# Patient Record
Sex: MALE | Race: WHITE | Employment: OTHER | URBAN - METROPOLITAN AREA
[De-identification: names, ages, dates, MRNs, and addresses within clinical notes are randomized per-mention and may not be internally consistent; named-entity substitution may affect disease eponyms.]

---

## 2024-10-23 ENCOUNTER — APPOINTMENT (OUTPATIENT)
Dept: CT IMAGING | Age: 59
DRG: 115 | End: 2024-10-23
Payer: MEDICAID

## 2024-10-23 ENCOUNTER — APPOINTMENT (OUTPATIENT)
Dept: GENERAL RADIOLOGY | Age: 59
DRG: 115 | End: 2024-10-23
Payer: MEDICAID

## 2024-10-23 ENCOUNTER — HOSPITAL ENCOUNTER (INPATIENT)
Age: 59
LOS: 1 days | Discharge: HOME | DRG: 115 | End: 2024-10-29
Attending: EMERGENCY MEDICINE | Admitting: STUDENT IN AN ORGANIZED HEALTH CARE EDUCATION/TRAINING PROGRAM
Payer: MEDICAID

## 2024-10-23 DIAGNOSIS — R55 SYNCOPE AND COLLAPSE: Primary | ICD-10-CM

## 2024-10-23 DIAGNOSIS — I48.20 CHRONIC A-FIB (HCC): ICD-10-CM

## 2024-10-23 DIAGNOSIS — S02.2XXA CLOSED FRACTURE OF NASAL BONE, INITIAL ENCOUNTER: ICD-10-CM

## 2024-10-23 DIAGNOSIS — R55 SYNCOPE, UNSPECIFIED SYNCOPE TYPE: ICD-10-CM

## 2024-10-23 DIAGNOSIS — R42 DIZZINESS: ICD-10-CM

## 2024-10-23 DIAGNOSIS — S52.392A: ICD-10-CM
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
## 2024-10-23 PROBLEM — I82.409 DVT (DEEP VENOUS THROMBOSIS) (HCC): Status: ACTIVE | Noted: 2021-10-18

## 2024-10-23 PROBLEM — M19.90 ARTHRITIS: Status: ACTIVE | Noted: 2021-10-18

## 2024-10-23 PROBLEM — N18.9 CHRONIC KIDNEY DISEASE: Status: ACTIVE | Noted: 2021-10-18

## 2024-10-23 PROBLEM — Z98.890 H/O FASCIOTOMY: Status: ACTIVE | Noted: 2024-01-30

## 2024-10-23 PROBLEM — N39.0 UTI (URINARY TRACT INFECTION): Status: ACTIVE | Noted: 2024-10-23

## 2024-10-23 PROBLEM — Z79.01 ANTICOAGULATED: Status: ACTIVE | Noted: 2024-10-23

## 2024-10-23 PROBLEM — I10 HYPERTENSION: Status: ACTIVE | Noted: 2021-10-18

## 2024-10-23 PROBLEM — S09.90XA HEAD TRAUMA, INITIAL ENCOUNTER: Status: ACTIVE | Noted: 2024-10-23

## 2024-10-23 PROBLEM — I73.9 PAD (PERIPHERAL ARTERY DISEASE) (HCC): Status: ACTIVE | Noted: 2024-01-30

## 2024-10-23 LAB
ABO + RH BLD: NORMAL
ALBUMIN SERPL-MCNC: 3.7 G/DL (ref 3.5–5.2)
ALBUMIN/GLOB SERPL: 1 {RATIO} (ref 1–2.5)
ALP SERPL-CCNC: 69 U/L (ref 40–129)
ALT SERPL-CCNC: 8 U/L (ref 10–50)
ANION GAP SERPL CALCULATED.3IONS-SCNC: 14 MMOL/L (ref 9–16)
ANION GAP SERPL CALCULATED.3IONS-SCNC: 20 MMOL/L (ref 9–16)
ANION GAP SERPL CALCULATED.3IONS-SCNC: ABNORMAL MMOL/L
ARM BAND NUMBER: NORMAL
AST SERPL-CCNC: 29 U/L (ref 10–50)
BACTERIA URNS QL MICRO: ABNORMAL
BASOPHILS # BLD: 0.03 K/UL (ref 0–0.2)
BASOPHILS NFR BLD: 1 % (ref 0–2)
BILIRUB SERPL-MCNC: 0.4 MG/DL (ref 0–1.2)
BILIRUB UR QL STRIP: NEGATIVE
BLOOD BANK SAMPLE EXPIRATION: NORMAL
BLOOD BANK SPECIMEN: ABNORMAL
BLOOD GROUP ANTIBODIES SERPL: NEGATIVE
BNP SERPL-MCNC: 1142 PG/ML (ref 0–300)
BODY TEMPERATURE: 37
BUN SERPL-MCNC: 12 MG/DL (ref 6–20)
BUN SERPL-MCNC: 12 MG/DL (ref 6–20)
BUN SERPL-MCNC: ABNORMAL MG/DL
CALCIUM SERPL-MCNC: 8.9 MG/DL (ref 8.6–10.4)
CASTS #/AREA URNS LPF: ABNORMAL /LPF (ref 0–8)
CHLORIDE SERPL-SCNC: 96 MMOL/L (ref 98–107)
CHLORIDE SERPL-SCNC: 98 MMOL/L (ref 98–107)
CHLORIDE SERPL-SCNC: ABNORMAL MMOL/L
CLARITY UR: ABNORMAL
CO2 SERPL-SCNC: 17 MMOL/L (ref 20–31)
CO2 SERPL-SCNC: 21 MMOL/L (ref 20–31)
CO2 SERPL-SCNC: ABNORMAL MMOL/L
COHGB MFR BLD: 2.3 % (ref 0–5)
COLOR UR: YELLOW
CREAT SERPL-MCNC: 1.3 MG/DL (ref 0.7–1.2)
CREAT SERPL-MCNC: 1.6 MG/DL (ref 0.7–1.2)
CREAT SERPL-MCNC: ABNORMAL MG/DL
EOSINOPHIL # BLD: 0.22 K/UL (ref 0–0.44)
EOSINOPHILS RELATIVE PERCENT: 4 % (ref 1–4)
EPI CELLS #/AREA URNS HPF: ABNORMAL /HPF (ref 0–5)
ERYTHROCYTE [DISTWIDTH] IN BLOOD BY AUTOMATED COUNT: 13.5 % (ref 11.8–14.4)
ERYTHROCYTE [DISTWIDTH] IN BLOOD BY AUTOMATED COUNT: 13.6 % (ref 11.8–14.4)
ETHANOL PERCENT: 0.06 %
ETHANOL PERCENT: ABNORMAL %
ETHANOLAMINE SERPL-MCNC: 57 MG/DL (ref 0–0.08)
ETHANOLAMINE SERPL-MCNC: ABNORMAL MG/DL (ref 0–0.08)
FIO2 ON VENT: ABNORMAL %
GFR, ESTIMATED: 52 ML/MIN/1.73M2
GFR, ESTIMATED: 63 ML/MIN/1.73M2
GFR, ESTIMATED: ABNORMAL ML/MIN/1.73M2
GLUCOSE SERPL-MCNC: 84 MG/DL (ref 74–99)
GLUCOSE SERPL-MCNC: 89 MG/DL (ref 74–99)
GLUCOSE SERPL-MCNC: ABNORMAL MG/DL
GLUCOSE UR STRIP-MCNC: NEGATIVE MG/DL
HCG SERPL QL: ABNORMAL
HCO3 VENOUS: 21.9 MMOL/L (ref 24–30)
HCT VFR BLD AUTO: 39.6 % (ref 40.7–50.3)
HCT VFR BLD AUTO: 42.4 % (ref 40.7–50.3)
HGB BLD-MCNC: 12.5 G/DL (ref 13–17)
HGB BLD-MCNC: 13.7 G/DL (ref 13–17)
HGB UR QL STRIP.AUTO: NEGATIVE
IMM GRANULOCYTES # BLD AUTO: <0.03 K/UL (ref 0–0.3)
IMM GRANULOCYTES NFR BLD: 0 %
INR PPP: 1.2
KETONES UR STRIP-MCNC: NEGATIVE MG/DL
LEUKOCYTE ESTERASE UR QL STRIP: ABNORMAL
LYMPHOCYTES NFR BLD: 3.13 K/UL (ref 1.1–3.7)
LYMPHOCYTES RELATIVE PERCENT: 49 % (ref 24–43)
MCH RBC QN AUTO: 30.5 PG (ref 25.2–33.5)
MCH RBC QN AUTO: 30.9 PG (ref 25.2–33.5)
MCHC RBC AUTO-ENTMCNC: 31.6 G/DL (ref 28.4–34.8)
MCHC RBC AUTO-ENTMCNC: 32.3 G/DL (ref 28.4–34.8)
MCV RBC AUTO: 95.5 FL (ref 82.6–102.9)
MCV RBC AUTO: 96.6 FL (ref 82.6–102.9)
MONOCYTES NFR BLD: 0.54 K/UL (ref 0.1–1.2)
MONOCYTES NFR BLD: 9 % (ref 3–12)
NEGATIVE BASE EXCESS, VEN: 3.9 MMOL/L (ref 0–2)
NEUTROPHILS NFR BLD: 37 % (ref 36–65)
NEUTS SEG NFR BLD: 2.34 K/UL (ref 1.5–8.1)
NITRITE UR QL STRIP: POSITIVE
NRBC BLD-RTO: 0 PER 100 WBC
NRBC BLD-RTO: 0 PER 100 WBC
O2 SAT, VEN: 75 % (ref 60–85)
PARTIAL THROMBOPLASTIN TIME: 26.7 SEC (ref 23–36.5)
PCO2 VENOUS: 45.1 MM HG (ref 39–55)
PH UR STRIP: 6 [PH] (ref 5–8)
PH VENOUS: 7.31 (ref 7.32–7.42)
PLATELET # BLD AUTO: 226 K/UL (ref 138–453)
PLATELET # BLD AUTO: 231 K/UL (ref 138–453)
PMV BLD AUTO: 10.3 FL (ref 8.1–13.5)
PMV BLD AUTO: 10.5 FL (ref 8.1–13.5)
PO2 VENOUS: 45 MM HG (ref 30–50)
POTASSIUM SERPL-SCNC: 3.8 MMOL/L (ref 3.7–5.3)
POTASSIUM SERPL-SCNC: 3.9 MMOL/L (ref 3.7–5.3)
POTASSIUM SERPL-SCNC: ABNORMAL MMOL/L
PROT SERPL-MCNC: 7 G/DL (ref 6.6–8.7)
PROT UR STRIP-MCNC: ABNORMAL MG/DL
PROTHROMBIN TIME: 14.6 SEC (ref 11.7–14.9)
RBC # BLD AUTO: 4.1 M/UL (ref 4.21–5.77)
RBC # BLD AUTO: 4.44 M/UL (ref 4.21–5.77)
RBC #/AREA URNS HPF: ABNORMAL /HPF (ref 0–4)
SODIUM SERPL-SCNC: 133 MMOL/L (ref 136–145)
SODIUM SERPL-SCNC: 133 MMOL/L (ref 136–145)
SODIUM SERPL-SCNC: ABNORMAL MMOL/L
SP GR UR STRIP: 1.01 (ref 1–1.03)
TROPONIN I SERPL HS-MCNC: 15 NG/L (ref 0–22)
TROPONIN I SERPL HS-MCNC: 16 NG/L (ref 0–22)
UROBILINOGEN UR STRIP-ACNC: NORMAL EU/DL (ref 0–1)
WBC #/AREA URNS HPF: ABNORMAL /HPF (ref 0–5)
WBC OTHER # BLD: 6.3 K/UL (ref 3.5–11.3)
WBC OTHER # BLD: 8.3 K/UL (ref 3.5–11.3)

## 2024-10-23 PROCEDURE — 85730 THROMBOPLASTIN TIME PARTIAL: CPT

## 2024-10-23 PROCEDURE — 86901 BLOOD TYPING SEROLOGIC RH(D): CPT

## 2024-10-23 PROCEDURE — 81001 URINALYSIS AUTO W/SCOPE: CPT

## 2024-10-23 PROCEDURE — 73110 X-RAY EXAM OF WRIST: CPT

## 2024-10-23 PROCEDURE — 2500000003 HC RX 250 WO HCPCS

## 2024-10-23 PROCEDURE — 93005 ELECTROCARDIOGRAM TRACING: CPT | Performed by: NURSE PRACTITIONER

## 2024-10-23 PROCEDURE — 74174 CTA ABD&PLVS W/CONTRAST: CPT

## 2024-10-23 PROCEDURE — 96375 TX/PRO/DX INJ NEW DRUG ADDON: CPT

## 2024-10-23 PROCEDURE — 85027 COMPLETE CBC AUTOMATED: CPT

## 2024-10-23 PROCEDURE — 99222 1ST HOSP IP/OBS MODERATE 55: CPT | Performed by: SURGERY

## 2024-10-23 PROCEDURE — 99223 1ST HOSP IP/OBS HIGH 75: CPT | Performed by: NURSE PRACTITIONER

## 2024-10-23 PROCEDURE — 99285 EMERGENCY DEPT VISIT HI MDM: CPT

## 2024-10-23 PROCEDURE — 85025 COMPLETE CBC W/AUTO DIFF WBC: CPT

## 2024-10-23 PROCEDURE — 73562 X-RAY EXAM OF KNEE 3: CPT

## 2024-10-23 PROCEDURE — 82565 ASSAY OF CREATININE: CPT

## 2024-10-23 PROCEDURE — 80051 ELECTROLYTE PANEL: CPT

## 2024-10-23 PROCEDURE — 96361 HYDRATE IV INFUSION ADD-ON: CPT

## 2024-10-23 PROCEDURE — 6360000004 HC RX CONTRAST MEDICATION

## 2024-10-23 PROCEDURE — 6370000000 HC RX 637 (ALT 250 FOR IP): Performed by: NURSE PRACTITIONER

## 2024-10-23 PROCEDURE — 84484 ASSAY OF TROPONIN QUANT: CPT

## 2024-10-23 PROCEDURE — 86900 BLOOD TYPING SEROLOGIC ABO: CPT

## 2024-10-23 PROCEDURE — 6360000002 HC RX W HCPCS

## 2024-10-23 PROCEDURE — 96374 THER/PROPH/DIAG INJ IV PUSH: CPT

## 2024-10-23 PROCEDURE — 84520 ASSAY OF UREA NITROGEN: CPT

## 2024-10-23 PROCEDURE — 6360000002 HC RX W HCPCS: Performed by: NURSE PRACTITIONER

## 2024-10-23 PROCEDURE — 71275 CT ANGIOGRAPHY CHEST: CPT

## 2024-10-23 PROCEDURE — 93005 ELECTROCARDIOGRAM TRACING: CPT

## 2024-10-23 PROCEDURE — 84703 CHORIONIC GONADOTROPIN ASSAY: CPT

## 2024-10-23 PROCEDURE — 80053 COMPREHEN METABOLIC PANEL: CPT

## 2024-10-23 PROCEDURE — 2580000003 HC RX 258: Performed by: NURSE PRACTITIONER

## 2024-10-23 PROCEDURE — 85610 PROTHROMBIN TIME: CPT

## 2024-10-23 PROCEDURE — 82805 BLOOD GASES W/O2 SATURATION: CPT

## 2024-10-23 PROCEDURE — 82947 ASSAY GLUCOSE BLOOD QUANT: CPT

## 2024-10-23 PROCEDURE — 86850 RBC ANTIBODY SCREEN: CPT

## 2024-10-23 PROCEDURE — 72125 CT NECK SPINE W/O DYE: CPT

## 2024-10-23 PROCEDURE — G0378 HOSPITAL OBSERVATION PER HR: HCPCS

## 2024-10-23 PROCEDURE — 70450 CT HEAD/BRAIN W/O DYE: CPT

## 2024-10-23 PROCEDURE — G0480 DRUG TEST DEF 1-7 CLASSES: HCPCS

## 2024-10-23 PROCEDURE — 83880 ASSAY OF NATRIURETIC PEPTIDE: CPT

## 2024-10-23 RX ORDER — ONDANSETRON 4 MG/1
4 TABLET, ORALLY DISINTEGRATING ORAL EVERY 8 HOURS PRN
Status: DISCONTINUED | OUTPATIENT
Start: 2024-10-23 | End: 2024-10-29 | Stop reason: HOSPADM

## 2024-10-23 RX ORDER — LORAZEPAM 2 MG/ML
4 INJECTION INTRAMUSCULAR
Status: DISCONTINUED | OUTPATIENT
Start: 2024-10-23 | End: 2024-10-29 | Stop reason: HOSPADM

## 2024-10-23 RX ORDER — LORAZEPAM 2 MG/1
2 TABLET ORAL
Status: DISCONTINUED | OUTPATIENT
Start: 2024-10-23 | End: 2024-10-29 | Stop reason: HOSPADM

## 2024-10-23 RX ORDER — METOPROLOL SUCCINATE 100 MG/1
100 TABLET, EXTENDED RELEASE ORAL DAILY
Status: ON HOLD | COMMUNITY
End: 2024-10-29 | Stop reason: HOSPADM

## 2024-10-23 RX ORDER — LORAZEPAM 2 MG/ML
1 INJECTION INTRAMUSCULAR
Status: DISCONTINUED | OUTPATIENT
Start: 2024-10-23 | End: 2024-10-29 | Stop reason: HOSPADM

## 2024-10-23 RX ORDER — LANOLIN ALCOHOL/MO/W.PET/CERES
100 CREAM (GRAM) TOPICAL DAILY
Status: DISCONTINUED | OUTPATIENT
Start: 2024-10-23 | End: 2024-10-29 | Stop reason: HOSPADM

## 2024-10-23 RX ORDER — ACETAMINOPHEN 650 MG/1
650 SUPPOSITORY RECTAL EVERY 6 HOURS PRN
Status: DISCONTINUED | OUTPATIENT
Start: 2024-10-23 | End: 2024-10-29 | Stop reason: HOSPADM

## 2024-10-23 RX ORDER — SODIUM CHLORIDE 9 MG/ML
INJECTION, SOLUTION INTRAVENOUS PRN
Status: DISCONTINUED | OUTPATIENT
Start: 2024-10-23 | End: 2024-10-29 | Stop reason: HOSPADM

## 2024-10-23 RX ORDER — TRAZODONE HYDROCHLORIDE 50 MG/1
50 TABLET, FILM COATED ORAL NIGHTLY
Status: DISCONTINUED | OUTPATIENT
Start: 2024-10-23 | End: 2024-10-29 | Stop reason: HOSPADM

## 2024-10-23 RX ORDER — METOPROLOL SUCCINATE 50 MG/1
100 TABLET, EXTENDED RELEASE ORAL DAILY
Status: DISCONTINUED | OUTPATIENT
Start: 2024-10-24 | End: 2024-10-25

## 2024-10-23 RX ORDER — SODIUM CHLORIDE 9 MG/ML
INJECTION, SOLUTION INTRAVENOUS CONTINUOUS
Status: DISCONTINUED | OUTPATIENT
Start: 2024-10-23 | End: 2024-10-24

## 2024-10-23 RX ORDER — SODIUM CHLORIDE 0.9 % (FLUSH) 0.9 %
5-40 SYRINGE (ML) INJECTION PRN
Status: DISCONTINUED | OUTPATIENT
Start: 2024-10-23 | End: 2024-10-29 | Stop reason: HOSPADM

## 2024-10-23 RX ORDER — FENTANYL CITRATE 50 UG/ML
100 INJECTION, SOLUTION INTRAMUSCULAR; INTRAVENOUS ONCE
Status: DISCONTINUED | OUTPATIENT
Start: 2024-10-23 | End: 2024-10-23

## 2024-10-23 RX ORDER — LORAZEPAM 2 MG/1
4 TABLET ORAL
Status: DISCONTINUED | OUTPATIENT
Start: 2024-10-23 | End: 2024-10-29 | Stop reason: HOSPADM

## 2024-10-23 RX ORDER — LORAZEPAM 1 MG/1
1 TABLET ORAL
Status: DISCONTINUED | OUTPATIENT
Start: 2024-10-23 | End: 2024-10-29 | Stop reason: HOSPADM

## 2024-10-23 RX ORDER — IOPAMIDOL 755 MG/ML
75 INJECTION, SOLUTION INTRAVASCULAR
Status: DISCONTINUED | OUTPATIENT
Start: 2024-10-23 | End: 2024-10-23

## 2024-10-23 RX ORDER — METOPROLOL TARTRATE 1 MG/ML
INJECTION, SOLUTION INTRAVENOUS
Status: COMPLETED
Start: 2024-10-23 | End: 2024-10-23

## 2024-10-23 RX ORDER — POTASSIUM CHLORIDE 1500 MG/1
40 TABLET, EXTENDED RELEASE ORAL PRN
Status: DISCONTINUED | OUTPATIENT
Start: 2024-10-23 | End: 2024-10-29 | Stop reason: HOSPADM

## 2024-10-23 RX ORDER — METOPROLOL TARTRATE 1 MG/ML
5 INJECTION, SOLUTION INTRAVENOUS ONCE
Status: COMPLETED | OUTPATIENT
Start: 2024-10-23 | End: 2024-10-23

## 2024-10-23 RX ORDER — ONDANSETRON 2 MG/ML
4 INJECTION INTRAMUSCULAR; INTRAVENOUS EVERY 6 HOURS PRN
Status: DISCONTINUED | OUTPATIENT
Start: 2024-10-23 | End: 2024-10-29 | Stop reason: HOSPADM

## 2024-10-23 RX ORDER — MORPHINE SULFATE 2 MG/ML
2 INJECTION, SOLUTION INTRAMUSCULAR; INTRAVENOUS EVERY 4 HOURS PRN
Status: DISCONTINUED | OUTPATIENT
Start: 2024-10-23 | End: 2024-10-29 | Stop reason: HOSPADM

## 2024-10-23 RX ORDER — SODIUM CHLORIDE 0.9 % (FLUSH) 0.9 %
5-40 SYRINGE (ML) INJECTION EVERY 12 HOURS SCHEDULED
Status: DISCONTINUED | OUTPATIENT
Start: 2024-10-23 | End: 2024-10-29 | Stop reason: HOSPADM

## 2024-10-23 RX ORDER — IOPAMIDOL 755 MG/ML
100 INJECTION, SOLUTION INTRAVASCULAR
Status: COMPLETED | OUTPATIENT
Start: 2024-10-23 | End: 2024-10-23

## 2024-10-23 RX ORDER — LORAZEPAM 2 MG/ML
2 INJECTION INTRAMUSCULAR
Status: DISCONTINUED | OUTPATIENT
Start: 2024-10-23 | End: 2024-10-29 | Stop reason: HOSPADM

## 2024-10-23 RX ORDER — ACETAMINOPHEN 325 MG/1
650 TABLET ORAL EVERY 6 HOURS PRN
Status: DISCONTINUED | OUTPATIENT
Start: 2024-10-23 | End: 2024-10-29 | Stop reason: HOSPADM

## 2024-10-23 RX ORDER — PANTOPRAZOLE SODIUM 40 MG/1
40 TABLET, DELAYED RELEASE ORAL
Status: DISCONTINUED | OUTPATIENT
Start: 2024-10-24 | End: 2024-10-29 | Stop reason: HOSPADM

## 2024-10-23 RX ORDER — FOLIC ACID 1 MG/1
1 TABLET ORAL DAILY
Status: DISCONTINUED | OUTPATIENT
Start: 2024-10-24 | End: 2024-10-29 | Stop reason: HOSPADM

## 2024-10-23 RX ORDER — OXYCODONE AND ACETAMINOPHEN 5; 325 MG/1; MG/1
1 TABLET ORAL EVERY 4 HOURS PRN
Status: DISCONTINUED | OUTPATIENT
Start: 2024-10-23 | End: 2024-10-29 | Stop reason: HOSPADM

## 2024-10-23 RX ORDER — TRAZODONE HYDROCHLORIDE 50 MG/1
50 TABLET, FILM COATED ORAL NIGHTLY
COMMUNITY

## 2024-10-23 RX ORDER — BISACODYL 10 MG
10 SUPPOSITORY, RECTAL RECTAL DAILY PRN
Status: DISCONTINUED | OUTPATIENT
Start: 2024-10-23 | End: 2024-10-29 | Stop reason: HOSPADM

## 2024-10-23 RX ORDER — LISINOPRIL 40 MG/1
40 TABLET ORAL DAILY
Status: ON HOLD | COMMUNITY
End: 2024-10-29 | Stop reason: HOSPADM

## 2024-10-23 RX ORDER — LIDOCAINE 4 G/G
1 PATCH TOPICAL DAILY
Status: DISCONTINUED | OUTPATIENT
Start: 2024-10-23 | End: 2024-10-29 | Stop reason: HOSPADM

## 2024-10-23 RX ORDER — POTASSIUM CHLORIDE 7.45 MG/ML
10 INJECTION INTRAVENOUS PRN
Status: DISCONTINUED | OUTPATIENT
Start: 2024-10-23 | End: 2024-10-29 | Stop reason: HOSPADM

## 2024-10-23 RX ORDER — LORAZEPAM 2 MG/ML
3 INJECTION INTRAMUSCULAR
Status: DISCONTINUED | OUTPATIENT
Start: 2024-10-23 | End: 2024-10-29 | Stop reason: HOSPADM

## 2024-10-23 RX ORDER — ROSUVASTATIN CALCIUM 20 MG/1
20 TABLET, COATED ORAL DAILY
Status: DISCONTINUED | OUTPATIENT
Start: 2024-10-23 | End: 2024-10-29 | Stop reason: HOSPADM

## 2024-10-23 RX ORDER — METOPROLOL SUCCINATE 50 MG/1
100 TABLET, EXTENDED RELEASE ORAL DAILY
Status: DISCONTINUED | OUTPATIENT
Start: 2024-10-23 | End: 2024-10-23

## 2024-10-23 RX ORDER — ROSUVASTATIN CALCIUM 20 MG/1
20 TABLET, COATED ORAL DAILY
COMMUNITY

## 2024-10-23 RX ORDER — LORAZEPAM 2 MG/ML
0.5 INJECTION INTRAMUSCULAR ONCE
Status: COMPLETED | OUTPATIENT
Start: 2024-10-23 | End: 2024-10-23

## 2024-10-23 RX ORDER — POLYETHYLENE GLYCOL 3350 17 G/17G
17 POWDER, FOR SOLUTION ORAL DAILY PRN
Status: DISCONTINUED | OUTPATIENT
Start: 2024-10-23 | End: 2024-10-29 | Stop reason: HOSPADM

## 2024-10-23 RX ADMIN — APIXABAN 5 MG: 5 TABLET, FILM COATED ORAL at 21:47

## 2024-10-23 RX ADMIN — TRAZODONE HYDROCHLORIDE 50 MG: 50 TABLET ORAL at 22:45

## 2024-10-23 RX ADMIN — IOPAMIDOL 100 ML: 755 INJECTION, SOLUTION INTRAVENOUS at 14:59

## 2024-10-23 RX ADMIN — METOPROLOL TARTRATE 5 MG: 5 INJECTION, SOLUTION INTRAVENOUS at 22:24

## 2024-10-23 RX ADMIN — LORAZEPAM 0.5 MG: 2 INJECTION INTRAMUSCULAR; INTRAVENOUS at 16:56

## 2024-10-23 RX ADMIN — METOPROLOL SUCCINATE 100 MG: 25 TABLET, EXTENDED RELEASE ORAL at 21:45

## 2024-10-23 RX ADMIN — Medication 100 MG: at 22:45

## 2024-10-23 RX ADMIN — WATER 1000 MG: 1 INJECTION INTRAMUSCULAR; INTRAVENOUS; SUBCUTANEOUS at 21:49

## 2024-10-23 RX ADMIN — METOPROLOL TARTRATE 5 MG: 1 INJECTION, SOLUTION INTRAVENOUS at 22:24

## 2024-10-23 RX ADMIN — SODIUM CHLORIDE: 9 INJECTION, SOLUTION INTRAVENOUS at 21:44

## 2024-10-23 RX ADMIN — ROSUVASTATIN CALCIUM 20 MG: 20 TABLET, FILM COATED ORAL at 22:45

## 2024-10-23 ASSESSMENT — PAIN - FUNCTIONAL ASSESSMENT: PAIN_FUNCTIONAL_ASSESSMENT: 0-10

## 2024-10-23 ASSESSMENT — PAIN SCALES - GENERAL: PAINLEVEL_OUTOF10: 10

## 2024-10-23 NOTE — PROGRESS NOTES
[facility]     Emergency/Trauma Note    PATIENT NAME: Rasheed Bryant    Shift date: 10/23/24  Shift day: Wednesday   Shift # 2    Room # 04/04   Name: Rasheed Bryant            Age: 59 y.o.  Gender: male          Mu-ism: Pentecostal   Place of Episcopal: unknown    Trauma/Incident type: Adult Trauma Alert  Admit Date & Time: 10/23/2024 12:06 PM  TRAUMA NAME: Rasheed bryant    ADVANCE DIRECTIVES IN CHART?  No    NAME OF DECISION MAKER: unknown    RELATIONSHIP OF DECISION MAKER TO PATIENT: unknown    PATIENT/EVENT DESCRIPTION:  Rasheed Bryant is a 59 y.o. male. Rasheed suffered injury to his wrist after a fall on the sidewalk Pt to be admitted to 04/04.         SPIRITUAL ASSESSMENT-INTERVENTION-OUTCOME:  This writer responded to  Father Ralph request to visit patient. Patient appeared to be in good spirits and engaged in conversation about his injuries.     PATIENT BELONGINGS:  No belongings noted    ANY BELONGINGS OF SIGNIFICANT VALUE NOTED:  None noted    REGISTRATION STAFF NOTIFIED?  Yes      WHAT IS YOUR SPIRITUAL CARE PLAN FOR THIS PATIENT?:   Continue to provide a sustaining presence as needed.    Electronically signed by Chaplain Zuleyma   10/23/24 1651   Encounter Summary   Encounter Overview/Reason Crisis   Service Provided For Patient   Referral/Consult From Multi-disciplinary team   Support System Unknown   Last Encounter  10/23/24   Complexity of Encounter Moderate   Begin Time 1640   End Time  1650   Total Time Calculated 10 min   Crisis   Type Trauma   Assessment/Intervention/Outcome   Assessment Calm;Coping   Intervention Active listening;Sustaining Presence/Ministry of presence   Outcome Coping     , on 10/23/2024 at 4:52 PM.  [facility]  [phone]

## 2024-10-23 NOTE — ED PROVIDER NOTES
[facility]     Emergency Department     Faculty Note/ Attestation      Pt Name: Rasheed Bryant                                       MRN: [id number]  Birthdate 1965  Date of evaluation: 10/23/2024    Patients PCP:    No primary care provider on file.    Note Started: 12:52 PM EDT      Attestation  I performed a history and physical examination of the patient and discussed management with the resident. I reviewed the resident’s note and agree with the documented findings and plan of care. Any areas of disagreement are noted on the chart. I was personally present for the key portions of any procedures. I have documented in the chart those procedures where I was not present during the key portions. I have reviewed the emergency nurses triage note. I agree with the chief complaint, past medical history, past surgical history, allergies, medications, social and family history as documented unless otherwise noted below.    For Physician Assistant/ Nurse Practitioner cases/documentation I have personally evaluated this patient and have completed at least one if not all key elements of the E/M (history, physical exam, and MDM). Additional findings are as noted.      Initial Screens:        [place] Coma Scale  Eye Opening: Spontaneous  Best Verbal Response: Oriented  Best Motor Response: Obeys commands  Ludy Coma Scale Score: 15    Vitals:    Vitals:    10/23/24 1230 10/23/24 1233 10/23/24 1238 10/23/24 1245   BP: (!) 81/56  93/67 (!) 87/64   Pulse: (!) 118 92 89 86   Resp: 21 28 16 12   Temp:       TempSrc:       SpO2:  93% 93% 97%   Weight:           CHIEF COMPLAINT       Chief Complaint   Patient presents with    Fall    Dizziness             DIAGNOSTIC RESULTS             RADIOLOGY:   No orders to display         LABS:  Labs Reviewed - No data to display      EMERGENCY DEPARTMENT COURSE:     -------------------------  BP: (!) 87/64, Temp: 97.5 °F (36.4 °C), Pulse: 86, Respirations:  12      Comments    Fall on sidewalk  60 yo felt dizzy prior to fall  Bruise to face  Removed cervical collar himself      Patient was not called as a trauma, he was placed in room 4, initial blood pressure was low 100s but now 87/64, we now know he that he is on blood thinners, we will perform head to toe trauma exam, trauma pan scan, trauma consult, encouraged c-collar use, if not we will inform him that he is taking the c-collar off AGAINST MEDICAL ADVICE    Initial labs showed mild BAR, left wrist fracture, given syncope and fall, will need to be admitted for BAR and cardiology evaluation, trauma surgery and orthopedics on board, will need to follow-up their recommendations.  Signed out to Dr. Shoemaker    (Please note that portions of this note were completed with a voice recognition program.  Efforts were made to edit the dictations but occasionally words are mis-transcribed.)      Steve Silver MD,, MD  Attending Emergency Physician         Steve Silver MD  10/23/24 4248

## 2024-10-23 NOTE — ED NOTES
Pt to ED via EMS after near syncopal episode. EMS states pt was walking outside and fell. Pt did hit his head, but did not lose consciousness. Pt c/o bilateral knee pain with abrasions to knees. Pt has hx of a-fib and is on blood thinners. Pt was placed in c-collar by EMS for precaution. Pt states he is still dizzy upon arrival. Pt mildly hypotensive for EMS. IV was placed with fluids running. Pt placed on continuous cardiac monitor, bp and pulse ox. EKG completed, blood work drawn. Pt alert and oriented x4, talking in complete sentences, respirations even and unlabored. Pt acting age appropriate. Will continue to plan of care.

## 2024-10-23 NOTE — ED PROVIDER NOTES
[facility]  FACULTY HANDOFF     4:11 PM EDT  Handoff taken on the following patient from prior Attending Physician:  Pt Name: Rasheedmj Bryant  PCP:  No primary care provider on file.    Attestation  I was available and discussed any additional care issues that arose and coordinated the management plans with the resident(s) caring for the patient during my duty period. Any areas of disagreement with resident's documentation of care or procedures are noted on the chart. I was personally present for the key portions of any/all procedures during my duty period. I have documented in the chart those procedures where I was not present during the key portions.         CHIEF COMPLAINT       Chief Complaint   Patient presents with    Fall    Dizziness         CURRENT MEDICATIONS     Previous Medications  Previous Medications    No medications on file       Encounter Medications  Orders Placed This Encounter   Medications    DISCONTD: fentaNYL (SUBLIMAZE) injection 100 mcg    DISCONTD: iopamidol (ISOVUE-370) 76 % injection 75 mL    iopamidol (ISOVUE-370) 76 % injection 100 mL       ALLERGIES     has No Known Allergies.      RECENT VITALS:   Temp: 97.5 °F (36.4 °C),  Pulse: 97, Respirations: 19, BP: 103/84    RADIOLOGY:   CTA CHEST W WO CONTRAST         XR WRIST LEFT (MIN 3 VIEWS)   Final Result   Left wrist:      1. Acute impacted intra-articular fracture through the metaphysis of the   distal radius.   2. Mild underlying osteoarthrosis.   3. Mild soft tissue swelling about the wrist.   Right knee:      No acute fracture or dislocation.      Left knee:      No acute fracture or dislocation.         XR KNEE RIGHT (3 VIEWS)   Final Result   Left wrist:      1. Acute impacted intra-articular fracture through the metaphysis of the   distal radius.   2. Mild underlying osteoarthrosis.   3. Mild soft tissue swelling about the wrist.   Right knee:      No acute fracture or dislocation.      Left knee:      No acute  fracture or dislocation.         XR KNEE LEFT (3 VIEWS)   Final Result   Left wrist:      1. Acute impacted intra-articular fracture through the metaphysis of the   distal radius.   2. Mild underlying osteoarthrosis.   3. Mild soft tissue swelling about the wrist.   Right knee:      No acute fracture or dislocation.      Left knee:      No acute fracture or dislocation.         CT HEAD WO CONTRAST    (Results Pending)   CT CERVICAL SPINE WO CONTRAST    (Results Pending)   CT THORACIC SPINE BONY RECONSTRUCTION    (Results Pending)   CT LUMBAR SPINE BONY RECONSTRUCTION    (Results Pending)   CTA ABDOMEN PELVIS W CONTRAST    (Results Pending)       LABS:  Labs Reviewed   COMPREHENSIVE METABOLIC PANEL - Abnormal; Notable for the following components:       Result Value    Sodium 133 (*)     Chloride 96 (*)     CO2 17 (*)     Anion Gap 20 (*)     Creatinine 1.6 (*)     Est, Glom Filt Rate 52 (*)     ALT 8 (*)     All other components within normal limits   CBC WITH AUTO DIFFERENTIAL - Abnormal; Notable for the following components:    RBC 4.10 (*)     Hemoglobin 12.5 (*)     Hematocrit 39.6 (*)     Lymphocytes % 49 (*)     All other components within normal limits   URINALYSIS - Abnormal; Notable for the following components:    Turbidity UA Cloudy (*)     Protein, UA TRACE (*)     Nitrite, Urine POSITIVE (*)     Leukocyte Esterase, Urine MODERATE (*)     All other components within normal limits   BRAIN NATRIURETIC PEPTIDE - Abnormal; Notable for the following components:    NT Pro-BNP 1,142 (*)     All other components within normal limits   TRAUMA PANEL - Abnormal; Notable for the following components:    pH, Jace 7.308 (*)     HCO3, Venous 21.9 (*)     Negative Base Excess, Jace 3.9 (*)     All other components within normal limits   ETHANOL - Abnormal; Notable for the following components:    Ethanol Lvl 57 (*)     Ethanol percent 0.057 (*)     All other components within normal limits   CREATININE - Abnormal; Notable

## 2024-10-23 NOTE — ED PROVIDER NOTES
[facility] ED  Emergency Department Encounter  Emergency Medicine Resident     Pt Name:Rasheed Bryant  MRN: [id number]  Birthdate 1965  Date of evaluation: 10/23/24  PCP:  No primary care provider on file.  Note Started: 12:42 PM EDT      CHIEF COMPLAINT       Chief Complaint   Patient presents with    Fall    Dizziness       HISTORY OF PRESENT ILLNESS  (Location/Symptom, Timing/Onset, Context/Setting, Quality, Duration, Modifying Factors, Severity.)      Rasheed Bryant is a 59 y.o. male who presents after a fall with subsequent confusion via EMS in [facility].  The patient recalls falling on the sidewalk and hitting his head, but has no recollection as of which side he fell on, or how he came here.  He states I fell and I tried to get picked up and then I could not .  He reports dizziness prior to the fall, stating I think I got dizzy and fell.  He was unable to get up due to knee pain and weakness and was assisted by a passerby.  The patient removed his cervical collar himself but reports mild neck stiffness and tenderness on examination of the cervical spine.  He denies any loss of consciousness or significant neck pain but have some difficulty with neck stiffness.  Patient also reports moderate middle back pain, left wrist pain and right knee pain.    The patient reports chronic knee pain and weakness and mention previous knee surgeries.  He has a history of hypertension and regular use of antihypertensive medication, although he does not regularly monitor his blood pressure himself but relies on his mother for blood pressure checks.  He denies current chest pain or dyspnea.  The patient does not sleep in a bed due to breathing difficulties and instead sleeps in a recliner at home.  Patient is states he has pulmonary edema and is on water pill.    He has not passed urine today despite consuming approximately 7 cups of water has had a bowel movement yesterday but not today.  He denies  Patient has no known allergies.    Home Medications:  Prior to Admission medications    Not on File         REVIEW OF SYSTEMS       Review of Systems   Gastrointestinal:  Positive for abdominal pain.        Patient is states he has 4-5 hernias.  Mild abdominal pain in suprapubic region   Musculoskeletal:  Positive for back pain and neck pain.        Middle back pain, left wrist pain, right knee pain.    Mild neck pain.   Neurological:  Positive for dizziness and light-headedness.        Fall   All other systems reviewed and are negative.      PHYSICAL EXAM      INITIAL VITALS:   /86   Pulse 96   Temp 97.5 °F (36.4 °C) (Oral)   Resp 19   Wt 117.9 kg (260 lb)   SpO2 98%     Physical Exam  Vitals and nursing note reviewed.   Constitutional:       General: He is not in acute distress.     Appearance: He is well-developed. He is not diaphoretic.   HENT:      Head: Normocephalic and atraumatic.      Nose: Nose normal.   Eyes:      Pupils: Pupils are equal, round, and reactive to light.   Neck:      Comments: Mild tenderness on palpation of the cervical spine, no severe pain or restriction in neck movement.  Cardiovascular:      Rate and Rhythm: Normal rate and regular rhythm.      Pulses:           Radial pulses are 2+ on the right side and 2+ on the left side.        Dorsalis pedis pulses are 2+ on the right side and 2+ on the left side.      Heart sounds: Normal heart sounds.      Comments: Patient with adequate palpable pulse as well as capillary refill to bilateral upper and lower distal extremities.    Pulmonary:      Effort: Pulmonary effort is normal. No respiratory distress.      Breath sounds: Examination of the right-lower field reveals decreased breath sounds. Examination of the left-lower field reveals decreased breath sounds. Decreased breath sounds present.   Abdominal:      General: Bowel sounds are normal. There is distension.      Palpations: Abdomen is soft.      Tenderness: There is abdominal

## 2024-10-23 NOTE — H&P
LABS  Labs Reviewed   COMPREHENSIVE METABOLIC PANEL - Abnormal; Notable for the following components:       Result Value    Sodium 133 (*)     Chloride 96 (*)     CO2 17 (*)     Anion Gap 20 (*)     Creatinine 1.6 (*)     Est, Glom Filt Rate 52 (*)     ALT 8 (*)     All other components within normal limits   CBC WITH AUTO DIFFERENTIAL - Abnormal; Notable for the following components:    RBC 4.10 (*)     Hemoglobin 12.5 (*)     Hematocrit 39.6 (*)     Lymphocytes % 49 (*)     All other components within normal limits   URINALYSIS - Abnormal; Notable for the following components:    Turbidity UA Cloudy (*)     Protein, UA TRACE (*)     Nitrite, Urine POSITIVE (*)     Leukocyte Esterase, Urine MODERATE (*)     All other components within normal limits   BRAIN NATRIURETIC PEPTIDE - Abnormal; Notable for the following components:    NT Pro-BNP 1,142 (*)     All other components within normal limits   TRAUMA PANEL - Abnormal; Notable for the following components:    pH, Jace 7.308 (*)     HCO3, Venous 21.9 (*)     Negative Base Excess, Jace 3.9 (*)     All other components within normal limits   ETHANOL - Abnormal; Notable for the following components:    Ethanol Lvl 57 (*)     Ethanol percent 0.057 (*)     All other components within normal limits   CREATININE - Abnormal; Notable for the following components:    Creatinine 1.3 (*)     All other components within normal limits   ELECTROLYTE PANEL - Abnormal; Notable for the following components:    Sodium 133 (*)     All other components within normal limits   MICROSCOPIC URINALYSIS - Abnormal; Notable for the following components:    Bacteria, UA MANY (*)     All other components within normal limits   TROPONIN   BUN   GLUCOSE, RANDOM   PREVIOUS SPECIMEN   TROPONIN   TYPE AND SCREEN         Ilan Espinoza MD  10/23/24, 5:46 PM

## 2024-10-24 ENCOUNTER — APPOINTMENT (OUTPATIENT)
Age: 59
DRG: 115 | End: 2024-10-24
Payer: MEDICAID

## 2024-10-24 PROBLEM — R55 SYNCOPE: Status: ACTIVE | Noted: 2024-10-24

## 2024-10-24 PROBLEM — I48.91 ATRIAL FIBRILLATION WITH RAPID VENTRICULAR RESPONSE (HCC): Status: ACTIVE | Noted: 2024-10-24

## 2024-10-24 LAB
25(OH)D3 SERPL-MCNC: 13.5 NG/ML (ref 30–100)
ANION GAP SERPL CALCULATED.3IONS-SCNC: 10 MMOL/L (ref 9–16)
BASOPHILS # BLD: <0.03 K/UL (ref 0–0.2)
BASOPHILS NFR BLD: 0 % (ref 0–2)
BUN SERPL-MCNC: 10 MG/DL (ref 6–20)
CALCIUM SERPL-MCNC: 8.6 MG/DL (ref 8.6–10.4)
CHLORIDE SERPL-SCNC: 102 MMOL/L (ref 98–107)
CO2 SERPL-SCNC: 22 MMOL/L (ref 20–31)
CREAT SERPL-MCNC: 0.9 MG/DL (ref 0.7–1.2)
ECHO AO ROOT DIAM: 3.2 CM
ECHO AO ROOT INDEX: 1.34 CM/M2
ECHO AV AREA PEAK VELOCITY: 2.7 CM2
ECHO AV AREA VTI: 2.7 CM2
ECHO AV AREA/BSA PEAK VELOCITY: 1.1 CM2/M2
ECHO AV AREA/BSA VTI: 1.1 CM2/M2
ECHO AV MEAN GRADIENT: 2 MMHG
ECHO AV MEAN GRADIENT: 2 MMHG
ECHO AV MEAN VELOCITY: 0.7 M/S
ECHO AV PEAK GRADIENT: 4 MMHG
ECHO AV PEAK VELOCITY: 1.1 M/S
ECHO AV VELOCITY RATIO: 0.64
ECHO AV VTI: 16.5 CM
ECHO BSA: 2.45 M2
ECHO LA AREA 2C: 25.2 CM2
ECHO LA AREA 4C: 36 CM2
ECHO LA DIAMETER INDEX: 1.89 CM/M2
ECHO LA DIAMETER: 4.5 CM
ECHO LA MAJOR AXIS: 8.2 CM
ECHO LA MINOR AXIS: 5.7 CM
ECHO LA TO AORTIC ROOT RATIO: 1.41
ECHO LA VOL MOD A2C: 89 ML (ref 18–58)
ECHO LA VOL MOD A4C: 125 ML (ref 18–58)
ECHO LA VOLUME INDEX MOD A2C: 37 ML/M2 (ref 16–34)
ECHO LA VOLUME INDEX MOD A4C: 53 ML/M2 (ref 16–34)
ECHO LV E' LATERAL VELOCITY: 14 CM/S
ECHO LV E' SEPTAL VELOCITY: 10.3 CM/S
ECHO LV EJECTION FRACTION BIPLANE: 50 % (ref 55–100)
ECHO LV FRACTIONAL SHORTENING: 40 % (ref 28–44)
ECHO LV INTERNAL DIMENSION DIASTOLE INDEX: 2.02 CM/M2
ECHO LV INTERNAL DIMENSION DIASTOLIC: 4.8 CM (ref 4.2–5.9)
ECHO LV INTERNAL DIMENSION SYSTOLIC INDEX: 1.22 CM/M2
ECHO LV INTERNAL DIMENSION SYSTOLIC: 2.9 CM
ECHO LV IVSD: 1 CM (ref 0.6–1)
ECHO LV MASS 2D: 170.2 G (ref 88–224)
ECHO LV MASS INDEX 2D: 71.5 G/M2 (ref 49–115)
ECHO LV POSTERIOR WALL DIASTOLIC: 1 CM (ref 0.6–1)
ECHO LV RELATIVE WALL THICKNESS RATIO: 0.42
ECHO LVOT AREA: 4.2 CM2
ECHO LVOT AV VTI INDEX: 0.64
ECHO LVOT DIAM: 2.3 CM
ECHO LVOT MEAN GRADIENT: 1 MMHG
ECHO LVOT PEAK GRADIENT: 2 MMHG
ECHO LVOT PEAK VELOCITY: 0.7 M/S
ECHO LVOT STROKE VOLUME INDEX: 18.5 ML/M2
ECHO LVOT SV: 44 ML
ECHO LVOT VTI: 10.6 CM
ECHO MV AREA VTI: 2.1 CM2
ECHO MV E DECELERATION TIME (DT): 248 MS
ECHO MV E VELOCITY: 0.92 M/S
ECHO MV E/E' LATERAL: 6.57
ECHO MV E/E' RATIO (AVERAGED): 7.75
ECHO MV E/E' SEPTAL: 8.93
ECHO MV LVOT VTI INDEX: 1.97
ECHO MV MAX VELOCITY: 1.2 M/S
ECHO MV MEAN GRADIENT: 2 MMHG
ECHO MV MEAN VELOCITY: 0.7 M/S
ECHO MV PEAK GRADIENT: 6 MMHG
ECHO MV VTI: 20.9 CM
ECHO PV MAX VELOCITY: 0.7 M/S
ECHO PV PEAK GRADIENT: 2 MMHG
ECHO RV FREE WALL PEAK S': 11.4 CM/S
ECHO RV INTERNAL DIMENSION: 4 CM
ECHO RV TAPSE: 2.4 CM (ref 1.7–?)
EKG ATRIAL RATE: 340 BPM
EKG Q-T INTERVAL: 302 MS
EKG Q-T INTERVAL: 380 MS
EKG QRS DURATION: 98 MS
EKG QRS DURATION: 98 MS
EKG QTC CALCULATION (BAZETT): 423 MS
EKG QTC CALCULATION (BAZETT): 485 MS
EKG R AXIS: 27 DEGREES
EKG R AXIS: 27 DEGREES
EKG T AXIS: 42 DEGREES
EKG T AXIS: 99 DEGREES
EKG VENTRICULAR RATE: 118 BPM
EKG VENTRICULAR RATE: 98 BPM
EOSINOPHIL # BLD: 0.05 K/UL (ref 0–0.44)
EOSINOPHILS RELATIVE PERCENT: 1 % (ref 1–4)
ERYTHROCYTE [DISTWIDTH] IN BLOOD BY AUTOMATED COUNT: 13.4 % (ref 11.8–14.4)
GFR, ESTIMATED: >90 ML/MIN/1.73M2
GLUCOSE SERPL-MCNC: 88 MG/DL (ref 74–99)
HCT VFR BLD AUTO: 38.9 % (ref 40.7–50.3)
HGB BLD-MCNC: 12.3 G/DL (ref 13–17)
IMM GRANULOCYTES # BLD AUTO: <0.03 K/UL (ref 0–0.3)
IMM GRANULOCYTES NFR BLD: 0 %
LYMPHOCYTES NFR BLD: 1.57 K/UL (ref 1.1–3.7)
LYMPHOCYTES RELATIVE PERCENT: 24 % (ref 24–43)
MCH RBC QN AUTO: 30.4 PG (ref 25.2–33.5)
MCHC RBC AUTO-ENTMCNC: 31.6 G/DL (ref 28.4–34.8)
MCV RBC AUTO: 96 FL (ref 82.6–102.9)
MONOCYTES NFR BLD: 0.54 K/UL (ref 0.1–1.2)
MONOCYTES NFR BLD: 8 % (ref 3–12)
NEUTROPHILS NFR BLD: 67 % (ref 36–65)
NEUTS SEG NFR BLD: 4.38 K/UL (ref 1.5–8.1)
NRBC BLD-RTO: 0 PER 100 WBC
PLATELET # BLD AUTO: 174 K/UL (ref 138–453)
PMV BLD AUTO: 10.1 FL (ref 8.1–13.5)
POTASSIUM SERPL-SCNC: 3.7 MMOL/L (ref 3.7–5.3)
PSA SERPL-MCNC: 1 NG/ML (ref 0–4)
RBC # BLD AUTO: 4.05 M/UL (ref 4.21–5.77)
SODIUM SERPL-SCNC: 134 MMOL/L (ref 136–145)
TSH SERPL DL<=0.05 MIU/L-ACNC: 1.52 UIU/ML (ref 0.27–4.2)
WBC OTHER # BLD: 6.6 K/UL (ref 3.5–11.3)

## 2024-10-24 PROCEDURE — 6370000000 HC RX 637 (ALT 250 FOR IP)

## 2024-10-24 PROCEDURE — 96361 HYDRATE IV INFUSION ADD-ON: CPT

## 2024-10-24 PROCEDURE — 2500000003 HC RX 250 WO HCPCS

## 2024-10-24 PROCEDURE — 84443 ASSAY THYROID STIM HORMONE: CPT

## 2024-10-24 PROCEDURE — 6370000000 HC RX 637 (ALT 250 FOR IP): Performed by: NURSE PRACTITIONER

## 2024-10-24 PROCEDURE — G0103 PSA SCREENING: HCPCS

## 2024-10-24 PROCEDURE — 36415 COLL VENOUS BLD VENIPUNCTURE: CPT

## 2024-10-24 PROCEDURE — 85025 COMPLETE CBC W/AUTO DIFF WBC: CPT

## 2024-10-24 PROCEDURE — 2580000003 HC RX 258: Performed by: FAMILY MEDICINE

## 2024-10-24 PROCEDURE — 93306 TTE W/DOPPLER COMPLETE: CPT | Performed by: INTERNAL MEDICINE

## 2024-10-24 PROCEDURE — 2580000003 HC RX 258: Performed by: NURSE PRACTITIONER

## 2024-10-24 PROCEDURE — 6360000002 HC RX W HCPCS: Performed by: NURSE PRACTITIONER

## 2024-10-24 PROCEDURE — 99223 1ST HOSP IP/OBS HIGH 75: CPT | Performed by: INTERNAL MEDICINE

## 2024-10-24 PROCEDURE — 96375 TX/PRO/DX INJ NEW DRUG ADDON: CPT

## 2024-10-24 PROCEDURE — 93306 TTE W/DOPPLER COMPLETE: CPT

## 2024-10-24 PROCEDURE — 99232 SBSQ HOSP IP/OBS MODERATE 35: CPT | Performed by: FAMILY MEDICINE

## 2024-10-24 PROCEDURE — 82306 VITAMIN D 25 HYDROXY: CPT

## 2024-10-24 PROCEDURE — G0378 HOSPITAL OBSERVATION PER HR: HCPCS

## 2024-10-24 PROCEDURE — 93010 ELECTROCARDIOGRAM REPORT: CPT | Performed by: INTERNAL MEDICINE

## 2024-10-24 PROCEDURE — 80048 BASIC METABOLIC PNL TOTAL CA: CPT

## 2024-10-24 PROCEDURE — 96376 TX/PRO/DX INJ SAME DRUG ADON: CPT

## 2024-10-24 RX ORDER — AMIODARONE HYDROCHLORIDE 200 MG/1
400 TABLET ORAL 2 TIMES DAILY
Status: DISCONTINUED | OUTPATIENT
Start: 2024-10-24 | End: 2024-10-24

## 2024-10-24 RX ORDER — AMIODARONE HYDROCHLORIDE 200 MG/1
400 TABLET ORAL 2 TIMES DAILY
Status: DISCONTINUED | OUTPATIENT
Start: 2024-10-27 | End: 2024-10-25

## 2024-10-24 RX ORDER — METOPROLOL TARTRATE 1 MG/ML
5 INJECTION, SOLUTION INTRAVENOUS EVERY 6 HOURS PRN
Status: DISCONTINUED | OUTPATIENT
Start: 2024-10-24 | End: 2024-10-29 | Stop reason: HOSPADM

## 2024-10-24 RX ORDER — SODIUM CHLORIDE 9 MG/ML
INJECTION, SOLUTION INTRAVENOUS CONTINUOUS
Status: DISCONTINUED | OUTPATIENT
Start: 2024-10-25 | End: 2024-10-26

## 2024-10-24 RX ORDER — AMIODARONE HYDROCHLORIDE 200 MG/1
800 TABLET ORAL 3 TIMES DAILY
Status: DISCONTINUED | OUTPATIENT
Start: 2024-10-24 | End: 2024-10-25

## 2024-10-24 RX ORDER — 0.9 % SODIUM CHLORIDE 0.9 %
500 INTRAVENOUS SOLUTION INTRAVENOUS ONCE
Status: COMPLETED | OUTPATIENT
Start: 2024-10-24 | End: 2024-10-24

## 2024-10-24 RX ADMIN — ROSUVASTATIN CALCIUM 20 MG: 20 TABLET, FILM COATED ORAL at 08:49

## 2024-10-24 RX ADMIN — OXYCODONE HYDROCHLORIDE AND ACETAMINOPHEN 1 TABLET: 5; 325 TABLET ORAL at 20:34

## 2024-10-24 RX ADMIN — TRAZODONE HYDROCHLORIDE 50 MG: 50 TABLET ORAL at 21:17

## 2024-10-24 RX ADMIN — METOPROLOL SUCCINATE 100 MG: 50 TABLET, EXTENDED RELEASE ORAL at 00:11

## 2024-10-24 RX ADMIN — SODIUM CHLORIDE, PRESERVATIVE FREE 10 ML: 5 INJECTION INTRAVENOUS at 20:30

## 2024-10-24 RX ADMIN — PANTOPRAZOLE SODIUM 40 MG: 40 TABLET, DELAYED RELEASE ORAL at 05:44

## 2024-10-24 RX ADMIN — AMIODARONE HYDROCHLORIDE 800 MG: 200 TABLET ORAL at 21:16

## 2024-10-24 RX ADMIN — SODIUM CHLORIDE: 9 INJECTION, SOLUTION INTRAVENOUS at 17:25

## 2024-10-24 RX ADMIN — APIXABAN 5 MG: 5 TABLET, FILM COATED ORAL at 08:47

## 2024-10-24 RX ADMIN — SODIUM CHLORIDE, PRESERVATIVE FREE 10 ML: 5 INJECTION INTRAVENOUS at 08:45

## 2024-10-24 RX ADMIN — APIXABAN 5 MG: 5 TABLET, FILM COATED ORAL at 21:17

## 2024-10-24 RX ADMIN — OXYCODONE HYDROCHLORIDE AND ACETAMINOPHEN 1 TABLET: 5; 325 TABLET ORAL at 00:10

## 2024-10-24 RX ADMIN — METOPROLOL TARTRATE 5 MG: 5 INJECTION INTRAVENOUS at 03:58

## 2024-10-24 RX ADMIN — SODIUM CHLORIDE 500 ML: 9 INJECTION, SOLUTION INTRAVENOUS at 15:23

## 2024-10-24 RX ADMIN — SODIUM CHLORIDE, PRESERVATIVE FREE 10 ML: 5 INJECTION INTRAVENOUS at 08:47

## 2024-10-24 RX ADMIN — MORPHINE SULFATE 2 MG: 2 INJECTION, SOLUTION INTRAMUSCULAR; INTRAVENOUS at 11:20

## 2024-10-24 RX ADMIN — Medication 100 MG: at 08:47

## 2024-10-24 RX ADMIN — MORPHINE SULFATE 2 MG: 2 INJECTION, SOLUTION INTRAMUSCULAR; INTRAVENOUS at 02:59

## 2024-10-24 RX ADMIN — FOLIC ACID 1 MG: 1 TABLET ORAL at 08:46

## 2024-10-24 RX ADMIN — AMIODARONE HYDROCHLORIDE 800 MG: 200 TABLET ORAL at 14:39

## 2024-10-24 RX ADMIN — SODIUM CHLORIDE, PRESERVATIVE FREE 10 ML: 5 INJECTION INTRAVENOUS at 20:20

## 2024-10-24 RX ADMIN — SODIUM CHLORIDE: 9 INJECTION, SOLUTION INTRAVENOUS at 11:22

## 2024-10-24 RX ADMIN — WATER 1000 MG: 1 INJECTION INTRAMUSCULAR; INTRAVENOUS; SUBCUTANEOUS at 21:21

## 2024-10-24 ASSESSMENT — PAIN DESCRIPTION - LOCATION
LOCATION: WRIST
LOCATION: WRIST
LOCATION_2: HEAD
LOCATION: ARM
LOCATION: WRIST;KNEE
LOCATION_3: KNEE

## 2024-10-24 ASSESSMENT — PAIN DESCRIPTION - DESCRIPTORS
DESCRIPTORS: ACHING;DISCOMFORT;THROBBING
DESCRIPTORS: ACHING;DISCOMFORT
DESCRIPTORS_3: ACHING
DESCRIPTORS: ACHING;DISCOMFORT;THROBBING
DESCRIPTORS_2: ACHING
DESCRIPTORS: ACHING;THROBBING
DESCRIPTORS: ACHING;DISCOMFORT;SHARP;SORE

## 2024-10-24 ASSESSMENT — PAIN DESCRIPTION - ORIENTATION
ORIENTATION_2: LEFT;ANTERIOR
ORIENTATION: RIGHT;LEFT
ORIENTATION: LEFT
ORIENTATION_3: RIGHT
ORIENTATION: LEFT;RIGHT
ORIENTATION: LEFT
ORIENTATION: LEFT

## 2024-10-24 ASSESSMENT — PAIN DESCRIPTION - ONSET
ONSET: ON-GOING
ONSET: ON-GOING

## 2024-10-24 ASSESSMENT — PAIN SCALES - GENERAL
PAINLEVEL_OUTOF10: 8
PAINLEVEL_OUTOF10: 6
PAINLEVEL_OUTOF10: 9
PAINLEVEL_OUTOF10: 8
PAINLEVEL_OUTOF10: 9
PAINLEVEL_OUTOF10: 7
PAINLEVEL_OUTOF10: 9
PAINLEVEL_OUTOF10: 3
PAINLEVEL_OUTOF10: 7
PAINLEVEL_OUTOF10: 6

## 2024-10-24 ASSESSMENT — PAIN - FUNCTIONAL ASSESSMENT
PAIN_FUNCTIONAL_ASSESSMENT: ACTIVITIES ARE NOT PREVENTED
PAIN_FUNCTIONAL_ASSESSMENT: PREVENTS OR INTERFERES SOME ACTIVE ACTIVITIES AND ADLS
PAIN_FUNCTIONAL_ASSESSMENT: ACTIVITIES ARE NOT PREVENTED

## 2024-10-24 ASSESSMENT — PAIN DESCRIPTION - INTENSITY
RATING_2: 6
RATING_3: 8
RATING_2: 6
RATING_3: 8

## 2024-10-24 ASSESSMENT — PAIN DESCRIPTION - FREQUENCY
FREQUENCY: CONTINUOUS
FREQUENCY: CONTINUOUS

## 2024-10-24 ASSESSMENT — PAIN DESCRIPTION - PAIN TYPE
TYPE: ACUTE PAIN
TYPE: ACUTE PAIN

## 2024-10-24 NOTE — CARE COORDINATION
Case Management Assessment  Initial Evaluation    Date/Time of Evaluation: 10/24/2024 5:04 PM  Assessment Completed by: Amy Starks RN    If patient is discharged prior to next notation, then this note serves as note for discharge by case management.    Patient Name: Rasheed Bryant                   YOB: 1965  Diagnosis: Syncope and collapse [R55]  Dizziness [R42]  Head trauma, initial encounter [S09.90XA]  Fall, initial encounter [W19.XXXA]  Syncope, unspecified syncope type [R55]                   Date / Time: 10/23/2024 12:06 PM    Patient Admission Status: Observation   Readmission Risk (Low < 19, Mod (19-27), High > 27): No data recorded  Current PCP: Paul Mujica APRN - CNP  PCP verified by CM? Yes    Chart Reviewed: Yes      History Provided by: Patient  Patient Orientation: Alert and Oriented, Person, Place, Situation    Patient Cognition: Alert    Hospitalization in the last 30 days (Readmission):  No    If yes, Readmission Assessment in CM Navigator will be completed.    Advance Directives:      Code Status: Full Code   Patient's Primary Decision Maker is: Legal Next of Kin      Discharge Planning:    Patient lives with: Parent (brother and nephew across the street from patient) Type of Home: House  Primary Care Giver: Self  Patient Support Systems include: Parent, Family Members   Current Financial resources: Medicaid, Medicare  Current community resources:    Current services prior to admission: Durable Medical Equipment            Current DME: Cane, Walker, Shower Chair            Type of Home Care services:  None    ADLS  Prior functional level: Independent in ADLs/IADLs  Current functional level: Other (see comment) (needs PT/OT evals)    PT AM-PAC:   /24  OT AM-PAC:   /24    Family can provide assistance at DC: Yes  Would you like Case Management to discuss the discharge plan with any other family members/significant others, and if so, who? Yes (mother- Alisha)  Plans to Return to

## 2024-10-24 NOTE — PROGRESS NOTES
Trauma Tertiary Survey    Admit Date: 10/23/2024  Hospital day 0      Subjective:     Patient was seen and examined at bedside.  Patient denied any new complaints since initial evaluation.  Patient denies any injuries aside from his left wrist.  Patient has a wrist brace that has been applied to the left wrist by orthopedic surgery.  Patient's only complaints are the dizziness and lightheadedness, for which he is being admitted to the medicine service.  All other ROS are negative.    Objective:     PHYSICAL EXAM:   Physical Exam  Vitals reviewed.   Constitutional:       General: He is not in acute distress.     Appearance: He is obese.   HENT:      Head: Normocephalic and atraumatic.   Cardiovascular:      Rate and Rhythm: Normal rate and regular rhythm.      Pulses: Normal pulses.   Pulmonary:      Effort: Pulmonary effort is normal. No respiratory distress.   Abdominal:      General: There is no distension.      Palpations: Abdomen is soft.      Tenderness: There is no abdominal tenderness.   Musculoskeletal:         General: Tenderness and signs of injury (Left wrist) present.      Comments: Wrist brace is in place.  Patient is neurovascularly intact.    Neurological:      Mental Status: He is alert.         Spine:     Spine Tenderness ROM   Cervical 0 /10 Normal   Thoracic 0 /10 Normal   Lumbar 0 /10 Normal     Musculoskeletal    Joint Tenderness Swelling ROM   Right shoulder absent absent normal   Left shoulder absent absent normal   Right elbow absent absent normal   Left elbow absent absent normal   Right wrist absent absent normal   Left wrist Present Present Abnormal   Right hand grasp absent absent normal   Left hand grasp absent absent normal   Right hip absent absent normal   Left hip absent absent normal   Right knee absent absent normal   Left knee absent absent normal   Right ankle absent absent normal   Left ankle absent absent normal   Right foot absent absent normal   Left foot absent absent

## 2024-10-24 NOTE — PROGRESS NOTES
[facility]  Occupational Therapy Not Seen Note    DATE: 10/24/2024    NAME: Rasheed Bryant  MRN: [id number]   : 1965      Patient not seen this date for Occupational Therapy due to:    Testing: ECHO    Next Scheduled Treatment: Ck in PM or 10/25     Electronically signed by Suzan Jacobsen OT on 10/24/2024 at 9:56 AM

## 2024-10-24 NOTE — PLAN OF CARE
Problem: Safety - Adult  Goal: Free from fall injury  10/24/2024 1718 by Kaleigh Mccain RN  Outcome: Progressing  10/24/2024 0407 by Tita Pittman RN  Outcome: Progressing     Problem: Pain  Goal: Verbalizes/displays adequate comfort level or baseline comfort level  10/24/2024 1718 by Kaleigh Mccain RN  Outcome: Progressing  10/24/2024 0407 by Tita Pittman RN  Outcome: Progressing  Flowsheets (Taken 10/23/2024 2230 by Peggy Garcia RN)  Verbalizes/displays adequate comfort level or baseline comfort level:   Encourage patient to monitor pain and request assistance   Assess pain using appropriate pain scale     Problem: ABCDS Injury Assessment  Goal: Absence of physical injury  10/24/2024 1718 by Kaleigh Mccain RN  Outcome: Progressing  10/24/2024 0407 by Tita Pittman RN  Outcome: Progressing

## 2024-10-24 NOTE — CONSULTS
Orthopaedic Surgery Consult  (Dr. Mcintosh)      CC/Reason for consult: Left distal radius fracture    HPI:      The patient is a 59 y.o.  male who presents after a syncopal fall from standing height.  Patient was brought to the emergency department due to left wrist pain and syncopal workup.  Radiographs in the emergency department demonstrated a minimally displaced left distal radius fracture for which we are consulted.    Patient seen and evaluated at bedside today.  He is somnolent and slightly confused regarding the events today.  He states all of his pain is located in his left wrist.  He denies prior orthopedic injuries or surgeries.  He denies significant past medical history despite documented history of CHF.  Patient states he takes no chemical anticoagulation.  He is a community ambulator at baseline.  Denies numbness or tingling in the distal extremity.      Past Medical History:    Past Medical History:   Diagnosis Date    CHF (congestive heart failure) (HCC)     Hyperlipidemia     Hypertension      Past Surgical History:    History reviewed. No pertinent surgical history.  Medications Prior to Admission:   Prior to Admission medications    Medication Sig Start Date End Date Taking? Authorizing Provider   apixaban (ELIQUIS) 5 MG TABS tablet Take 1 tablet by mouth 2 times daily   Yes ProviderFallon MD   lisinopril (PRINIVIL;ZESTRIL) 40 MG tablet Take 1 tablet by mouth daily   Yes ProviderFallon MD   metoprolol succinate (TOPROL XL) 100 MG extended release tablet Take 1 tablet by mouth daily   Yes ProviderFallon MD   rosuvastatin (CRESTOR) 20 MG tablet Take 1 tablet by mouth daily   Yes ProviderFallon MD   traZODone (DESYREL) 50 MG tablet Take 1 tablet by mouth nightly   Yes ProviderFallon MD     Allergies:    Patient has no known allergies.  Social History:   Social History     Socioeconomic History    Marital status:      Spouse name: None    Number of

## 2024-10-24 NOTE — CONSULTS
[facility]    Consult / H&P               Today's Date: 10/24/2024  Patient Name: Rasheed Bryant  Date of admission: 10/23/2024 12:06 PM  Patient's age: 59 y.o., 1965  Admission Dx: Syncope and collapse [R55]  Dizziness [R42]  Head trauma, initial encounter [S09.90XA]  Fall, initial encounter [W19.XXXA]  Syncope, unspecified syncope type [R55]    Requesting Physician: No admitting provider for patient encounter.    Cardiac Evaluation Reason:  irregular heart rate, syncopal episode with fall, low blood pressure while on Eliquis    History Obtained From: patient and chart review     History of Present Illness:    This patient 59 y.o. years old with past medical history given below.   Pt has a history of Afib, CHF, and HTN and takes Eliquis 5mg BID, pt experienced a near-syncopal episode yesterday. He was walking outside when he suddenly felt dizzy, fell and hit his head but denies LOC. Pt was brought into [facility] [place] ED via EMS, EMS notes he was mildly hypotensive during transport and fluids were given. In ED, BP was initially in the low 100s systolic but then became 87/64 mmHg. BP discrepancy between upper extremities was also noted by ER resident. Pt ethanol was 0.057, troponin WNL, pro-BNP elevated at 1142, Cr elevated 1.3, nitrite and leukocyte esterase (+) on UA. Imaging included CTA chest/abdomen/pelvis and revealed no aneurysms or dissections. Pt was admitted. As of this morning, pt's BP was 117/70 mmHg and his heart rate was 108 bpm.    Past Medical History:   has a past medical history of CHF (congestive heart failure) (HCC), Hyperlipidemia, and Hypertension.    Past Surgical History:   has no past surgical history on file.     Home Medications:    Prior to Admission medications    Medication Sig Start Date End Date Taking? Authorizing Provider   apixaban (ELIQUIS) 5 MG TABS tablet Take 1 tablet by mouth 2 times daily   Yes Provider, Fallon, MD   lisinopril (PRINIVIL;ZESTRIL) 40 MG tablet
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
## 2024-10-24 NOTE — PLAN OF CARE
Problem: Safety - Adult  Goal: Free from fall injury  Outcome: Progressing     Problem: Pain  Goal: Verbalizes/displays adequate comfort level or baseline comfort level  Outcome: Progressing  Flowsheets (Taken 10/23/2024 2230 by Peggy Garcia RN)  Verbalizes/displays adequate comfort level or baseline comfort level:   Encourage patient to monitor pain and request assistance   Assess pain using appropriate pain scale     Problem: ABCDS Injury Assessment  Goal: Absence of physical injury  Outcome: Progressing

## 2024-10-24 NOTE — ED NOTES
ED to inpatient nurses report      Chief Complaint:  Chief Complaint   Patient presents with    Fall    Dizziness     Present to ED from: EMS    MOA:     LOC: alert and orientated to name, place, date  Mobility: Requires assistance * 1  Oxygen Baseline: RA    Current needs required: None   Pending ED orders: None  Present condition: Stable    Why did the patient come to the ED? Pt to ED via EMS after near syncopal episode. EMS states pt was walking outside and fell. Pt did hit his head, but did not lose consciousness. Pt c/o bilateral knee pain with abrasions to knees. Pt has hx of a-fib and is on blood thinners. Pt was placed in c-collar by EMS for precaution. Pt states he is still dizzy upon arrival. Pt mildly hypotensive for EMS. IV was placed with fluids running. Pt placed on continuous cardiac monitor, bp and pulse ox. EKG completed, blood work drawn. Pt alert and oriented x4, talking in complete sentences, respirations even and unlabored. Pt acting age appropriate.   What is the plan? Admit for BP control, abx  Any procedures or intervention occur? CT, x-ray, labs, EKG  Any safety concerns?? None    Mental Status:  Level of Consciousness: Alert (0)    Psych Assessment:   Psychosocial  Psychosocial (WDL): Within Defined Limits  Vital signs   Vitals:    10/23/24 1630 10/23/24 1745 10/23/24 1940 10/23/24 1945   BP: 119/84 116/82  130/86   Pulse: 100 88 96    Resp:       Temp:       TempSrc:       SpO2:  97%  98%   Weight:            Vitals:  Patient Vitals for the past 24 hrs:   BP Temp Temp src Pulse Resp SpO2 Weight   10/23/24 1945 130/86 -- -- -- -- 98 % --   10/23/24 1940 -- -- -- 96 -- -- --   10/23/24 1745 116/82 -- -- 88 -- 97 % --   10/23/24 1630 119/84 -- -- 100 -- -- --   10/23/24 1626 -- -- -- 85 -- 97 % --   10/23/24 1615 119/79 -- -- 87 -- 98 % --   10/23/24 1556 -- -- -- 97 -- 99 % --   10/23/24 1545 103/84 -- -- 84 -- 97 % --   10/23/24 1543 -- -- -- 99 -- 97 % --   10/23/24 1530 103/67 -- -- 99 --  infusion    thiamine tablet 100 mg    OR Linked Order Group     LORazepam (ATIVAN) tablet 1 mg     LORazepam (ATIVAN) injection 1 mg     LORazepam (ATIVAN) tablet 2 mg     LORazepam (ATIVAN) injection 2 mg     LORazepam (ATIVAN) tablet 3 mg     LORazepam (ATIVAN) injection 3 mg     LORazepam (ATIVAN) tablet 4 mg     LORazepam (ATIVAN) injection 4 mg    pantoprazole (PROTONIX) tablet 40 mg    lidocaine 4 % external patch 1 patch       SURGICAL HISTORY     History reviewed. No pertinent surgical history.    PAST MEDICAL HISTORY       Past Medical History:   Diagnosis Date    CHF (congestive heart failure) (HCC)     Hyperlipidemia     Hypertension        Labs:  Labs Reviewed   COMPREHENSIVE METABOLIC PANEL - Abnormal; Notable for the following components:       Result Value    Sodium 133 (*)     Chloride 96 (*)     CO2 17 (*)     Anion Gap 20 (*)     Creatinine 1.6 (*)     Est, Glom Filt Rate 52 (*)     ALT 8 (*)     All other components within normal limits   CBC WITH AUTO DIFFERENTIAL - Abnormal; Notable for the following components:    RBC 4.10 (*)     Hemoglobin 12.5 (*)     Hematocrit 39.6 (*)     Lymphocytes % 49 (*)     All other components within normal limits   URINALYSIS - Abnormal; Notable for the following components:    Turbidity UA Cloudy (*)     Protein, UA TRACE (*)     Nitrite, Urine POSITIVE (*)     Leukocyte Esterase, Urine MODERATE (*)     All other components within normal limits   BRAIN NATRIURETIC PEPTIDE - Abnormal; Notable for the following components:    NT Pro-BNP 1,142 (*)     All other components within normal limits   TRAUMA PANEL - Abnormal; Notable for the following components:    pH, Jace 7.308 (*)     HCO3, Venous 21.9 (*)     Negative Base Excess, Jace 3.9 (*)     All other components within normal limits   ETHANOL - Abnormal; Notable for the following components:    Ethanol Lvl 57 (*)     Ethanol percent 0.057 (*)     All other components within normal limits   CREATININE - Abnormal;
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
## 2024-10-24 NOTE — DISCHARGE INSTRUCTIONS
Follow up with your PCP in 3 days. Call for an appointment as soon as possible.  Follow-up with specialist as instructed.  Call for an appointment as soon as possible.  Medications as instructed.  Return to the emergency department immediately for any new or worsening concerns.    Orthopaedic Instructions:  -Weight bearing status: Non weight bearing with the left arm  - Maintain wrist brace at all times. Okay to remove for hygiene purposes only  -Always look for signs of compartment syndrome: pain out of proportion to the injury, pain not controlled with pain medication, numbness in digits, changing of color of digits (paleness). If these signs occur return to ED immediately for reassessment.  -Always work on finger motion to decrease swelling.  -Ice (20 minutes on and off 1 hour) and elevate above the level of the heart to reduce swelling and throbbing pain.  -Call the office or come to Emergency Room if signs of infection appear (hot, swollen, red, draining pus, fever)  -Take medications as prescribed.  -Wean off narcotics (percocet/norco) as soon as possible. Do not take tylenol if still taking narcotics.  -Follow up with  orthopedic surgery resident clinic  on  11/6 . Call [phone] to schedule/confirm.

## 2024-10-25 LAB
ANION GAP SERPL CALCULATED.3IONS-SCNC: 11 MMOL/L (ref 9–16)
BASOPHILS # BLD: <0.03 K/UL (ref 0–0.2)
BASOPHILS NFR BLD: 0 % (ref 0–2)
BUN SERPL-MCNC: 8 MG/DL (ref 6–20)
CALCIUM SERPL-MCNC: 8.3 MG/DL (ref 8.6–10.4)
CHLORIDE SERPL-SCNC: 102 MMOL/L (ref 98–107)
CO2 SERPL-SCNC: 24 MMOL/L (ref 20–31)
CORTIS SERPL-MCNC: 3.4 UG/DL (ref 2.5–19.5)
CREAT SERPL-MCNC: 0.8 MG/DL (ref 0.7–1.2)
EOSINOPHIL # BLD: 0.13 K/UL (ref 0–0.44)
EOSINOPHILS RELATIVE PERCENT: 2 % (ref 1–4)
ERYTHROCYTE [DISTWIDTH] IN BLOOD BY AUTOMATED COUNT: 13.3 % (ref 11.8–14.4)
GFR, ESTIMATED: >90 ML/MIN/1.73M2
GLUCOSE SERPL-MCNC: 85 MG/DL (ref 74–99)
HCT VFR BLD AUTO: 36.3 % (ref 40.7–50.3)
HGB BLD-MCNC: 11.3 G/DL (ref 13–17)
IMM GRANULOCYTES # BLD AUTO: <0.03 K/UL (ref 0–0.3)
IMM GRANULOCYTES NFR BLD: 0 %
LYMPHOCYTES NFR BLD: 1.37 K/UL (ref 1.1–3.7)
LYMPHOCYTES RELATIVE PERCENT: 25 % (ref 24–43)
MCH RBC QN AUTO: 30.2 PG (ref 25.2–33.5)
MCHC RBC AUTO-ENTMCNC: 31.1 G/DL (ref 28.4–34.8)
MCV RBC AUTO: 97.1 FL (ref 82.6–102.9)
MONOCYTES NFR BLD: 0.49 K/UL (ref 0.1–1.2)
MONOCYTES NFR BLD: 9 % (ref 3–12)
NEUTROPHILS NFR BLD: 64 % (ref 36–65)
NEUTS SEG NFR BLD: 3.57 K/UL (ref 1.5–8.1)
NRBC BLD-RTO: 0 PER 100 WBC
PLATELET # BLD AUTO: 148 K/UL (ref 138–453)
PMV BLD AUTO: 10.4 FL (ref 8.1–13.5)
POTASSIUM SERPL-SCNC: 3.6 MMOL/L (ref 3.7–5.3)
RBC # BLD AUTO: 3.74 M/UL (ref 4.21–5.77)
SODIUM SERPL-SCNC: 137 MMOL/L (ref 136–145)
WBC OTHER # BLD: 5.6 K/UL (ref 3.5–11.3)

## 2024-10-25 PROCEDURE — 99233 SBSQ HOSP IP/OBS HIGH 50: CPT | Performed by: NURSE PRACTITIONER

## 2024-10-25 PROCEDURE — 6370000000 HC RX 637 (ALT 250 FOR IP): Performed by: NURSE PRACTITIONER

## 2024-10-25 PROCEDURE — G0378 HOSPITAL OBSERVATION PER HR: HCPCS

## 2024-10-25 PROCEDURE — 2580000003 HC RX 258: Performed by: NURSE PRACTITIONER

## 2024-10-25 PROCEDURE — 97162 PT EVAL MOD COMPLEX 30 MIN: CPT

## 2024-10-25 PROCEDURE — 97166 OT EVAL MOD COMPLEX 45 MIN: CPT

## 2024-10-25 PROCEDURE — 85025 COMPLETE CBC W/AUTO DIFF WBC: CPT

## 2024-10-25 PROCEDURE — 97530 THERAPEUTIC ACTIVITIES: CPT

## 2024-10-25 PROCEDURE — 80048 BASIC METABOLIC PNL TOTAL CA: CPT

## 2024-10-25 PROCEDURE — 96376 TX/PRO/DX INJ SAME DRUG ADON: CPT

## 2024-10-25 PROCEDURE — 82533 TOTAL CORTISOL: CPT

## 2024-10-25 PROCEDURE — 6370000000 HC RX 637 (ALT 250 FOR IP): Performed by: FAMILY MEDICINE

## 2024-10-25 PROCEDURE — 96361 HYDRATE IV INFUSION ADD-ON: CPT

## 2024-10-25 PROCEDURE — 6360000002 HC RX W HCPCS: Performed by: NURSE PRACTITIONER

## 2024-10-25 PROCEDURE — 96375 TX/PRO/DX INJ NEW DRUG ADDON: CPT

## 2024-10-25 PROCEDURE — 99232 SBSQ HOSP IP/OBS MODERATE 35: CPT | Performed by: FAMILY MEDICINE

## 2024-10-25 PROCEDURE — 36415 COLL VENOUS BLD VENIPUNCTURE: CPT

## 2024-10-25 PROCEDURE — 97535 SELF CARE MNGMENT TRAINING: CPT

## 2024-10-25 RX ORDER — AMIODARONE HYDROCHLORIDE 200 MG/1
400 TABLET ORAL ONCE
Status: COMPLETED | OUTPATIENT
Start: 2024-10-25 | End: 2024-10-25

## 2024-10-25 RX ORDER — GINSENG 100 MG
CAPSULE ORAL 2 TIMES DAILY
Status: DISCONTINUED | OUTPATIENT
Start: 2024-10-25 | End: 2024-10-29 | Stop reason: HOSPADM

## 2024-10-25 RX ORDER — ERGOCALCIFEROL 1.25 MG/1
50000 CAPSULE, LIQUID FILLED ORAL WEEKLY
Qty: 12 CAPSULE | Refills: 1 | Status: SHIPPED | OUTPATIENT
Start: 2024-10-25

## 2024-10-25 RX ORDER — ERGOCALCIFEROL 1.25 MG/1
50000 CAPSULE, LIQUID FILLED ORAL WEEKLY
Status: DISCONTINUED | OUTPATIENT
Start: 2024-10-25 | End: 2024-10-29 | Stop reason: HOSPADM

## 2024-10-25 RX ORDER — AMIODARONE HYDROCHLORIDE 200 MG/1
800 TABLET ORAL 2 TIMES DAILY
Status: DISCONTINUED | OUTPATIENT
Start: 2024-10-25 | End: 2024-10-29

## 2024-10-25 RX ORDER — DIGOXIN 0.25 MG/ML
250 INJECTION INTRAMUSCULAR; INTRAVENOUS EVERY 4 HOURS
Status: COMPLETED | OUTPATIENT
Start: 2024-10-25 | End: 2024-10-25

## 2024-10-25 RX ORDER — METOPROLOL SUCCINATE 50 MG/1
50 TABLET, EXTENDED RELEASE ORAL 2 TIMES DAILY
Status: DISCONTINUED | OUTPATIENT
Start: 2024-10-25 | End: 2024-10-26

## 2024-10-25 RX ORDER — AMIODARONE HYDROCHLORIDE 200 MG/1
200 TABLET ORAL 2 TIMES DAILY
Status: DISCONTINUED | OUTPATIENT
Start: 2024-10-25 | End: 2024-10-25

## 2024-10-25 RX ADMIN — SODIUM CHLORIDE: 9 INJECTION, SOLUTION INTRAVENOUS at 00:00

## 2024-10-25 RX ADMIN — DIGOXIN 250 MCG: 0.25 INJECTION INTRAMUSCULAR; INTRAVENOUS at 16:55

## 2024-10-25 RX ADMIN — ERGOCALCIFEROL 50000 UNITS: 1.25 CAPSULE ORAL at 08:43

## 2024-10-25 RX ADMIN — AMIODARONE HYDROCHLORIDE 800 MG: 200 TABLET ORAL at 20:34

## 2024-10-25 RX ADMIN — SODIUM CHLORIDE, PRESERVATIVE FREE 10 ML: 5 INJECTION INTRAVENOUS at 08:44

## 2024-10-25 RX ADMIN — OXYCODONE HYDROCHLORIDE AND ACETAMINOPHEN 1 TABLET: 5; 325 TABLET ORAL at 20:17

## 2024-10-25 RX ADMIN — METOPROLOL SUCCINATE 50 MG: 50 TABLET, EXTENDED RELEASE ORAL at 08:43

## 2024-10-25 RX ADMIN — AMIODARONE HYDROCHLORIDE 400 MG: 200 TABLET ORAL at 11:12

## 2024-10-25 RX ADMIN — OXYCODONE HYDROCHLORIDE AND ACETAMINOPHEN 1 TABLET: 5; 325 TABLET ORAL at 08:43

## 2024-10-25 RX ADMIN — OXYCODONE HYDROCHLORIDE AND ACETAMINOPHEN 1 TABLET: 5; 325 TABLET ORAL at 02:39

## 2024-10-25 RX ADMIN — METOPROLOL SUCCINATE 50 MG: 50 TABLET, EXTENDED RELEASE ORAL at 20:34

## 2024-10-25 RX ADMIN — AMIODARONE HYDROCHLORIDE 200 MG: 200 TABLET ORAL at 08:43

## 2024-10-25 RX ADMIN — OXYCODONE HYDROCHLORIDE AND ACETAMINOPHEN 1 TABLET: 5; 325 TABLET ORAL at 13:56

## 2024-10-25 RX ADMIN — FOLIC ACID 1 MG: 1 TABLET ORAL at 08:43

## 2024-10-25 RX ADMIN — ROSUVASTATIN CALCIUM 20 MG: 20 TABLET, FILM COATED ORAL at 08:43

## 2024-10-25 RX ADMIN — PANTOPRAZOLE SODIUM 40 MG: 40 TABLET, DELAYED RELEASE ORAL at 06:05

## 2024-10-25 RX ADMIN — DIGOXIN 250 MCG: 0.25 INJECTION INTRAMUSCULAR; INTRAVENOUS at 11:12

## 2024-10-25 RX ADMIN — SODIUM CHLORIDE, PRESERVATIVE FREE 10 ML: 5 INJECTION INTRAVENOUS at 20:34

## 2024-10-25 RX ADMIN — BACITRACIN: 500 OINTMENT TOPICAL at 20:57

## 2024-10-25 RX ADMIN — Medication 100 MG: at 08:43

## 2024-10-25 RX ADMIN — TRAZODONE HYDROCHLORIDE 50 MG: 50 TABLET ORAL at 22:11

## 2024-10-25 RX ADMIN — WATER 1000 MG: 1 INJECTION INTRAMUSCULAR; INTRAVENOUS; SUBCUTANEOUS at 20:33

## 2024-10-25 RX ADMIN — APIXABAN 5 MG: 5 TABLET, FILM COATED ORAL at 08:44

## 2024-10-25 RX ADMIN — APIXABAN 5 MG: 5 TABLET, FILM COATED ORAL at 20:34

## 2024-10-25 ASSESSMENT — PAIN DESCRIPTION - ORIENTATION
ORIENTATION: RIGHT
ORIENTATION: LEFT;RIGHT
ORIENTATION_2: LEFT;ANTERIOR
ORIENTATION: LEFT
ORIENTATION_3: RIGHT
ORIENTATION: RIGHT;ANTERIOR
ORIENTATION: RIGHT;LEFT

## 2024-10-25 ASSESSMENT — PAIN DESCRIPTION - LOCATION
LOCATION_3: KNEE
LOCATION: WRIST
LOCATION: WRIST;KNEE
LOCATION: KNEE
LOCATION_2: HEAD
LOCATION: KNEE;WRIST
LOCATION: KNEE
LOCATION: ARM;KNEE

## 2024-10-25 ASSESSMENT — PAIN DESCRIPTION - PAIN TYPE
TYPE: ACUTE PAIN

## 2024-10-25 ASSESSMENT — PAIN SCALES - GENERAL
PAINLEVEL_OUTOF10: 0
PAINLEVEL_OUTOF10: 4
PAINLEVEL_OUTOF10: 8
PAINLEVEL_OUTOF10: 5
PAINLEVEL_OUTOF10: 6
PAINLEVEL_OUTOF10: 5
PAINLEVEL_OUTOF10: 5
PAINLEVEL_OUTOF10: 7
PAINLEVEL_OUTOF10: 3
PAINLEVEL_OUTOF10: 8
PAINLEVEL_OUTOF10: 5
PAINLEVEL_OUTOF10: 3

## 2024-10-25 ASSESSMENT — PAIN DESCRIPTION - ONSET
ONSET: ON-GOING

## 2024-10-25 ASSESSMENT — PAIN DESCRIPTION - DESCRIPTORS
DESCRIPTORS: SORE
DESCRIPTORS: ACHING;DISCOMFORT
DESCRIPTORS: DISCOMFORT;TENDER
DESCRIPTORS: DISCOMFORT;SORE
DESCRIPTORS: DISCOMFORT;THROBBING
DESCRIPTORS: ACHING;DISCOMFORT

## 2024-10-25 ASSESSMENT — PAIN - FUNCTIONAL ASSESSMENT: PAIN_FUNCTIONAL_ASSESSMENT: PREVENTS OR INTERFERES SOME ACTIVE ACTIVITIES AND ADLS

## 2024-10-25 ASSESSMENT — PAIN DESCRIPTION - FREQUENCY
FREQUENCY: CONTINUOUS

## 2024-10-25 ASSESSMENT — PAIN DESCRIPTION - INTENSITY
RATING_3: 2
RATING_2: 0
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
## 2024-10-25 NOTE — PROGRESS NOTES
[facility]   Progress Note                   Date:   10/25/2024  Patient name: Rasheed Bryant  Date of admission:  10/23/2024 12:06 PM  MRN:   [id number]  YOB: 1965  PCP: Paul Mujica APRN - CNP    Reason for Admission: Syncope and collapse [R55]  Dizziness [R42]  Head trauma, initial encounter [S09.90XA]  Fall, initial encounter [W19.XXXA]  Syncope, unspecified syncope type [R55]    Subjective:       Clinical Changes / Abnormalities: Pt Pt seen and examined in the room.  Pt denies any CP or sob.  Labs, vitals and tele reviewed-  Pt remains afib. RVR with activity     Medications:   Scheduled Meds:   vitamin D  50,000 Units Oral Weekly    amiodarone  200 mg Oral BID    metoprolol succinate  50 mg Oral BID    cefTRIAXone (ROCEPHIN) IV  1,000 mg IntraVENous Q24H    apixaban  5 mg Oral BID    rosuvastatin  20 mg Oral Daily    traZODone  50 mg Oral Nightly    sodium chloride flush  5-40 mL IntraVENous 2 times per day    sodium chloride flush  5-40 mL IntraVENous 2 times per day    thiamine  100 mg Oral Daily    pantoprazole  40 mg Oral QAM AC    lidocaine  1 patch TransDERmal Daily    folic acid  1 mg Oral Daily     Continuous Infusions:   sodium chloride 75 mL/hr at 10/25/24 0737    sodium chloride      sodium chloride       CBC:   Recent Labs     10/23/24  1347 10/24/24  0733 10/25/24  0828   WBC 8.3 6.6 5.6   HGB 13.7 12.3* 11.3*    174 148     BMP:    Recent Labs     10/23/24  1513 10/24/24  0733 10/25/24  0828   * 134* 137   K 3.8 3.7 3.6*   CL 98 102 102   CO2 21 22 24   BUN 12 10 8   CREATININE 1.3* 0.9 0.8   GLUCOSE 84 88 85     Hepatic:   Recent Labs     10/23/24  1306   AST 29   ALT 8*   BILITOT 0.4   ALKPHOS 69     Troponin:   Recent Labs     10/23/24  1306 10/23/24  1513   TROPHS 16 15     BNP: No results for input(s): \"BNP\" in the last 72 hours.  Lipids: No results for input(s): \"CHOL\", \"HDL\" in the last 72 hours.    Invalid input(s): \"LDLCALCU\"  INR:   Recent

## 2024-10-25 NOTE — PROGRESS NOTES
Physical Therapy  Facility/Department: [address] [facility]  Physical Therapy Initial Assessment    Name: Rasheed Bryant  : 1965  MRN: [id number]  Date of Service: 10/25/2024    Chief Complaint   Patient presents with    Fall    Dizziness       Discharge Recommendations:    Further therapy recommended at discharge.    PT Equipment Recommendations  Other: CTA, pt has cane      Patient Diagnosis(es): The primary encounter diagnosis was Syncope and collapse. Diagnoses of Fall, initial encounter, Dizziness, and Syncope, unspecified syncope type were also pertinent to this visit.  Past Medical History:  has a past medical history of CHF (congestive heart failure) (HCC), Hyperlipidemia, and Hypertension.  Past Surgical History:  has no past surgical history on file.    Assessment  Body Structures, Functions, Activity Limitations Requiring Skilled Therapeutic Intervention: Decreased functional mobility ;Decreased strength;Decreased safe awareness;Decreased balance;Increased pain  Assessment: Pt SBA bed mobility, CGA to stand but modA from lower surface, amb 4' SPC Latha. Pt would benefit from continued acute PT to address deficits.  Therapy Prognosis: Good  Decision Making: Medium Complexity  Requires PT Follow-Up: Yes  Activity Tolerance  Activity Tolerance: Patient tolerated treatment well;Other (comment)  Activity Tolerance Comments: elevated HR with mobility, up to 156 bpm observed    Plan  Physical Therapy Plan  General Plan: 6-7 times per week  Current Treatment Recommendations: Strengthening, Balance training, Gait training, Functional mobility training, Stair training, Transfer training, Endurance training, Home exercise program, Safety education & training, Patient/Caregiver education & training, Equipment evaluation, education, & procurement, Therapeutic activities  Safety Devices  Type of Devices: Call light within reach, Nurse notified, Gait belt, Patient at risk for falls, Left in chair, All fall risk  precautions in place, Telesitter in use  Restraints  Restraints Initially in Place: No    Restrictions  Restrictions/Precautions  Restrictions/Precautions: General Precautions, Fall Risk, Seizure, Up as Tolerated, Weight Bearing  Required Braces or Orthoses?: Yes (L wrist brace)  Upper Extremity Weight Bearing Restrictions  Left Upper Extremity Weight Bearing: Non Weight Bearing  Position Activity Restriction  Other position/activity restrictions: L distal radius fracture, removable splint in place, trauma signed off.     Subjective  General  Patient assessed for rehabilitation services?: Yes  Response To Previous Treatment: Not applicable  Family / Caregiver Present: No  Follows Commands: Within Functional Limits  General Comment  Comments: RN and pt agreeable to PT. Pt alert in bed upon arrival.  Subjective  Subjective: Pt reports 5/10 pain L wrist, R knee, headache. 1-2/10 L rib. Pt denies any numbness or tingling. Pt provided increased mobility/ ambulation, distraction and emotional support to address pain.         Social/Functional History  Social/Functional History  Lives With:  (mom. brother is across the street)  Type of Home: House  Home Layout: Two level, Bed/Bath upstairs, Laundry in basement (pt does not go to the basement)  Home Access: Stairs to enter without rails  Entrance Stairs - Number of Steps: 3  Bathroom Shower/Tub: Tub/Shower unit, Shower chair with back  Bathroom Toilet: Standard (commode set over toilet)  Bathroom Equipment: Shower chair, Commode  Bathroom Accessibility: Accessible  Home Equipment: Cane  Has the patient had two or more falls in the past year or any fall with injury in the past year?: Yes (2-3 more from slipping)  ADL Assistance: Independent (increased time)  Homemaking Assistance: Independent  Homemaking Responsibilities: Yes (brother hired to do laundry. split cooking/ cleaning/ laundry)  Ambulation Assistance: Independent  Transfer Assistance: Independent  Active :

## 2024-10-26 LAB
ANION GAP SERPL CALCULATED.3IONS-SCNC: 8 MMOL/L (ref 9–16)
BASOPHILS # BLD: <0.03 K/UL (ref 0–0.2)
BASOPHILS NFR BLD: 0 % (ref 0–2)
BUN SERPL-MCNC: 6 MG/DL (ref 6–20)
CALCIUM SERPL-MCNC: 8.1 MG/DL (ref 8.6–10.4)
CHLORIDE SERPL-SCNC: 100 MMOL/L (ref 98–107)
CO2 SERPL-SCNC: 24 MMOL/L (ref 20–31)
CREAT SERPL-MCNC: 0.8 MG/DL (ref 0.7–1.2)
EOSINOPHIL # BLD: 0.14 K/UL (ref 0–0.44)
EOSINOPHILS RELATIVE PERCENT: 2 % (ref 1–4)
ERYTHROCYTE [DISTWIDTH] IN BLOOD BY AUTOMATED COUNT: 13.2 % (ref 11.8–14.4)
GFR, ESTIMATED: >90 ML/MIN/1.73M2
GLUCOSE SERPL-MCNC: 96 MG/DL (ref 74–99)
HCT VFR BLD AUTO: 34.6 % (ref 40.7–50.3)
HGB BLD-MCNC: 10.8 G/DL (ref 13–17)
IMM GRANULOCYTES # BLD AUTO: 0.03 K/UL (ref 0–0.3)
IMM GRANULOCYTES NFR BLD: 1 %
LYMPHOCYTES NFR BLD: 0.89 K/UL (ref 1.1–3.7)
LYMPHOCYTES RELATIVE PERCENT: 14 % (ref 24–43)
MCH RBC QN AUTO: 30.6 PG (ref 25.2–33.5)
MCHC RBC AUTO-ENTMCNC: 31.2 G/DL (ref 28.4–34.8)
MCV RBC AUTO: 98 FL (ref 82.6–102.9)
MONOCYTES NFR BLD: 0.53 K/UL (ref 0.1–1.2)
MONOCYTES NFR BLD: 8 % (ref 3–12)
NEUTROPHILS NFR BLD: 75 % (ref 36–65)
NEUTS SEG NFR BLD: 4.67 K/UL (ref 1.5–8.1)
NRBC BLD-RTO: 0 PER 100 WBC
PLATELET # BLD AUTO: 135 K/UL (ref 138–453)
PMV BLD AUTO: 10.4 FL (ref 8.1–13.5)
POTASSIUM SERPL-SCNC: 4 MMOL/L (ref 3.7–5.3)
RBC # BLD AUTO: 3.53 M/UL (ref 4.21–5.77)
SODIUM SERPL-SCNC: 132 MMOL/L (ref 136–145)
WBC OTHER # BLD: 6.3 K/UL (ref 3.5–11.3)

## 2024-10-26 PROCEDURE — G0378 HOSPITAL OBSERVATION PER HR: HCPCS

## 2024-10-26 PROCEDURE — 96361 HYDRATE IV INFUSION ADD-ON: CPT

## 2024-10-26 PROCEDURE — 2580000003 HC RX 258: Performed by: NURSE PRACTITIONER

## 2024-10-26 PROCEDURE — 36415 COLL VENOUS BLD VENIPUNCTURE: CPT

## 2024-10-26 PROCEDURE — 97535 SELF CARE MNGMENT TRAINING: CPT

## 2024-10-26 PROCEDURE — 99233 SBSQ HOSP IP/OBS HIGH 50: CPT | Performed by: NURSE PRACTITIONER

## 2024-10-26 PROCEDURE — 6370000000 HC RX 637 (ALT 250 FOR IP): Performed by: NURSE PRACTITIONER

## 2024-10-26 PROCEDURE — 97530 THERAPEUTIC ACTIVITIES: CPT

## 2024-10-26 PROCEDURE — 80048 BASIC METABOLIC PNL TOTAL CA: CPT

## 2024-10-26 PROCEDURE — 85025 COMPLETE CBC W/AUTO DIFF WBC: CPT

## 2024-10-26 PROCEDURE — 99232 SBSQ HOSP IP/OBS MODERATE 35: CPT | Performed by: STUDENT IN AN ORGANIZED HEALTH CARE EDUCATION/TRAINING PROGRAM

## 2024-10-26 PROCEDURE — 97110 THERAPEUTIC EXERCISES: CPT

## 2024-10-26 RX ORDER — AMIODARONE HYDROCHLORIDE 400 MG/1
800 TABLET ORAL 2 TIMES DAILY
Qty: 120 TABLET | Refills: 0 | Status: CANCELLED | OUTPATIENT
Start: 2024-10-26 | End: 2024-11-25

## 2024-10-26 RX ORDER — DILTIAZEM HYDROCHLORIDE 30 MG/1
30 TABLET, FILM COATED ORAL EVERY 6 HOURS SCHEDULED
Status: DISCONTINUED | OUTPATIENT
Start: 2024-10-26 | End: 2024-10-29

## 2024-10-26 RX ORDER — METOPROLOL SUCCINATE 25 MG/1
37.5 TABLET, EXTENDED RELEASE ORAL 2 TIMES DAILY
Status: DISCONTINUED | OUTPATIENT
Start: 2024-10-26 | End: 2024-10-29

## 2024-10-26 RX ORDER — METOPROLOL SUCCINATE 25 MG/1
37.5 TABLET, EXTENDED RELEASE ORAL 2 TIMES DAILY
Qty: 30 TABLET | Refills: 3 | Status: CANCELLED | OUTPATIENT
Start: 2024-10-26

## 2024-10-26 RX ADMIN — SODIUM CHLORIDE, PRESERVATIVE FREE 10 ML: 5 INJECTION INTRAVENOUS at 09:42

## 2024-10-26 RX ADMIN — OXYCODONE HYDROCHLORIDE AND ACETAMINOPHEN 1 TABLET: 5; 325 TABLET ORAL at 14:59

## 2024-10-26 RX ADMIN — SODIUM CHLORIDE: 9 INJECTION, SOLUTION INTRAVENOUS at 03:16

## 2024-10-26 RX ADMIN — APIXABAN 5 MG: 5 TABLET, FILM COATED ORAL at 09:42

## 2024-10-26 RX ADMIN — ROSUVASTATIN CALCIUM 20 MG: 20 TABLET, FILM COATED ORAL at 09:42

## 2024-10-26 RX ADMIN — BACITRACIN: 500 OINTMENT TOPICAL at 19:41

## 2024-10-26 RX ADMIN — SODIUM CHLORIDE, PRESERVATIVE FREE 10 ML: 5 INJECTION INTRAVENOUS at 09:41

## 2024-10-26 RX ADMIN — OXYCODONE HYDROCHLORIDE AND ACETAMINOPHEN 1 TABLET: 5; 325 TABLET ORAL at 06:45

## 2024-10-26 RX ADMIN — FOLIC ACID 1 MG: 1 TABLET ORAL at 09:42

## 2024-10-26 RX ADMIN — DILTIAZEM HYDROCHLORIDE 30 MG: 30 TABLET, FILM COATED ORAL at 17:51

## 2024-10-26 RX ADMIN — PANTOPRAZOLE SODIUM 40 MG: 40 TABLET, DELAYED RELEASE ORAL at 06:41

## 2024-10-26 RX ADMIN — TRAZODONE HYDROCHLORIDE 50 MG: 50 TABLET ORAL at 21:58

## 2024-10-26 RX ADMIN — Medication 100 MG: at 09:42

## 2024-10-26 RX ADMIN — OXYCODONE HYDROCHLORIDE AND ACETAMINOPHEN 1 TABLET: 5; 325 TABLET ORAL at 23:58

## 2024-10-26 RX ADMIN — METOPROLOL SUCCINATE 37.5 MG: 25 TABLET, EXTENDED RELEASE ORAL at 09:41

## 2024-10-26 RX ADMIN — APIXABAN 5 MG: 5 TABLET, FILM COATED ORAL at 21:58

## 2024-10-26 RX ADMIN — OXYCODONE HYDROCHLORIDE AND ACETAMINOPHEN 1 TABLET: 5; 325 TABLET ORAL at 19:39

## 2024-10-26 RX ADMIN — AMIODARONE HYDROCHLORIDE 800 MG: 200 TABLET ORAL at 19:40

## 2024-10-26 RX ADMIN — SODIUM CHLORIDE, PRESERVATIVE FREE 10 ML: 5 INJECTION INTRAVENOUS at 19:41

## 2024-10-26 RX ADMIN — DILTIAZEM HYDROCHLORIDE 30 MG: 30 TABLET, FILM COATED ORAL at 23:58

## 2024-10-26 RX ADMIN — METOPROLOL SUCCINATE 37.5 MG: 25 TABLET, EXTENDED RELEASE ORAL at 19:45

## 2024-10-26 RX ADMIN — AMIODARONE HYDROCHLORIDE 800 MG: 200 TABLET ORAL at 09:46

## 2024-10-26 RX ADMIN — BACITRACIN: 500 OINTMENT TOPICAL at 09:40

## 2024-10-26 ASSESSMENT — PAIN DESCRIPTION - PAIN TYPE
TYPE: ACUTE PAIN
TYPE: ACUTE PAIN

## 2024-10-26 ASSESSMENT — PAIN DESCRIPTION - ONSET
ONSET: ON-GOING
ONSET: ON-GOING

## 2024-10-26 ASSESSMENT — PAIN SCALES - GENERAL
PAINLEVEL_OUTOF10: 8
PAINLEVEL_OUTOF10: 9
PAINLEVEL_OUTOF10: 8
PAINLEVEL_OUTOF10: 5
PAINLEVEL_OUTOF10: 8
PAINLEVEL_OUTOF10: 5
PAINLEVEL_OUTOF10: 2
PAINLEVEL_OUTOF10: 3
PAINLEVEL_OUTOF10: 6
PAINLEVEL_OUTOF10: 7

## 2024-10-26 ASSESSMENT — PAIN DESCRIPTION - LOCATION
LOCATION: WRIST
LOCATION: ARM;WRIST;SHOULDER
LOCATION: ARM;WRIST;SHOULDER
LOCATION: WRIST
LOCATION: WRIST

## 2024-10-26 ASSESSMENT — PAIN DESCRIPTION - ORIENTATION
ORIENTATION: LEFT;UPPER
ORIENTATION: LEFT
ORIENTATION: LEFT;UPPER
ORIENTATION: LEFT
ORIENTATION: LEFT

## 2024-10-26 ASSESSMENT — PAIN DESCRIPTION - DESCRIPTORS
DESCRIPTORS: ACHING;SORE
DESCRIPTORS: SORE
DESCRIPTORS: ACHING;SORE

## 2024-10-26 ASSESSMENT — PAIN DESCRIPTION - FREQUENCY
FREQUENCY: INTERMITTENT
FREQUENCY: INTERMITTENT

## 2024-10-26 ASSESSMENT — PAIN - FUNCTIONAL ASSESSMENT: PAIN_FUNCTIONAL_ASSESSMENT: PREVENTS OR INTERFERES SOME ACTIVE ACTIVITIES AND ADLS

## 2024-10-26 NOTE — PROGRESS NOTES
(L wrist brace placed by ortho)  Upper Extremity Weight Bearing Restrictions  Left Upper Extremity Weight Bearing: Non Weight Bearing  Position Activity Restriction  Other position/activity restrictions: Syncope and Collapse. L distal radius fracture, removable splint in place, trauma signed off. Perfecct Served Trauma, Ilan Espinoza, okay to eval cervical imaging is clear     Subjective  General  Chart Reviewed: No  Patient assessed for rehabilitation services?: Yes  Response To Previous Treatment: Patient with no complaints from previous session.  Family / Caregiver Present: No  Follows Commands: Within Functional Limits  General Comment  Comments: Pt retired to EOB with FERRARI present, RN notified.  Subjective  Subjective: Pt and RN agreeable to tx session, Pt EOB with FERRARI upon arrival notiong 6/10 LUE and 2/10 R knee, mobilized to decrease pain. Pt pleasant and cooperative t/o session.    Cognition   Orientation  Overall Orientation Status: Impaired  Orientation Level: Oriented X4  Cognition  Overall Cognitive Status: Exceptions  Arousal/Alertness: Appropriate responses to stimuli  Following Commands: Follows all commands without difficulty  Attention Span: Attends with cues to redirect  Memory: Appears intact  Safety Judgement: Decreased awareness of need for assistance  Problem Solving: Assistance required to identify errors made  Insights: Decreased awareness of deficits  Initiation: Requires cues for some  Sequencing: Requires cues for some  Cognition Comment: Pt slightly impulsive verbal cues to decrease pace at which tasks are completed.    Objective  Bed mobility  Supine to Sit: Unable to assess (FERRARI performed)  Sit to Supine: Unable to assess (Retired EOB)  Scooting: Stand by assistance  Bed Mobility Comments: Pt began and concluded session EOB.  Transfers  Sit to Stand: Moderate Assistance  Stand to Sit: Contact guard assistance  Comment: Assessed with SPC, Pt completed x 3 sit<>stands. Pt  SPC  Short Term Goal 4: Pt will navigate 4 steps Julio     Education  Patient Education  Education Given To: Patient  Education Provided: Role of Therapy;Plan of Care;Transfer Training;Energy Conservation  Education Method: Demonstration;Verbal  Barriers to Learning: None  Education Outcome: Verbalized understanding;Demonstrated understanding    Therapy Time   Individual Concurrent Group Co-treatment   Time In 1349         Time Out 1423         Minutes 34         Timed Code Treatment Minutes: 23 Minutes     Rosas Mann, PTA

## 2024-10-26 NOTE — PROGRESS NOTES
standing at sink. Total time 20 minutes w/multiple standing rest breaks.)    Transfers/Mobility  Bed mobility  Supine to Sit: Contact guard assistance  Sit to Supine: Stand by assistance  Scooting: Modified independent  Bed Mobility Comments: HOB 35 degrees.    Transfers  Sit to stand: Minimal assistance (Min/mod A.)  Stand to sit: Contact guard assistance  Transfer Comments: Transfers completed x3.    Functional Mobility: Setup;Increased time to complete;Minimal assistance (Functional mobility to/from bathroom using SPC, functional mobility household distance using SPC. Pt impulsive requiring verbal cues to slow down with fair return. HR monitored with pt needing standing rest breaks x4 d/t HR reaching 134-145. Pt able to lower HR within a few minutes)  Functional Mobility Skilled Clinical Factors: Gair belt; SPC.     Patient Education  Patient Education  Education Given To: Patient  Education Provided Comments: OT POC, transfer safety, importance of participation in therapy, pursed lip breathing, weight bearing status, EC/WS with fair return.    Goals  Short Term Goals  Time Frame for Short Term Goals: Pt will, by discharge:  Short Term Goal 1: Dem Mod I with functional transfers for daily occupations with LRAD  Short Term Goal 2: Dem Mod I with dynamic mobility with LRAD for household distances  Short Term Goal 3: Dem good safety awareness tech for all transfers/mobility including NWB LUE without verbal cuing  Short Term Goal 4: Dem Min A with adl performance with use of AE as needed utilizing one handed techs  Short Term Goal 5: Dem a 10 min static/dynamic standing balance task with CGA to increase activity tolerance for ADL'S/IADLS with LRAD  Short Term Goal 6: Dem I with bed mobility following NWB LUE precautions  Short Term Goal 7: Be alert and oriented x 4 in 3/4 tx sessions to increase awareness of surroundings    Plan  Occupational Therapy Plan  Times Per Week: 5-6 x week(T)  Current Treatment

## 2024-10-26 NOTE — PROGRESS NOTES
[facility]  Office: [phone]  Arun Aguilera DO, Alex Muñoz DO, Peter Parham DO, Ferdinand Zaman DO, Mecca Gonzalez MD, Jennifer Choi MD, Lashawn Vick MD, Sonia Barboza MD,  Amaury Cano MD, Jami Hinojosa MD, Alfonso Moe MD,  Vicky Rodríguez DO, Shady Baez MD, Law Horton MD, Sunny Aguilera DO, Cindy Mccarthy MD,  Steve Roa DO, Shiela Mason MD, Rabia Cummings MD, Jaclyn Olivier MD, Zack Geiger MD,  Marco Wisdom MD, Papo Downey MD, Kyle Geronimo MD, Cassidy Santana MD, Camron Roberts MD, Sacha Booker MD, Randell Galdamez DO, Charbel Forrest MD, Shirley Waterhouse, CNP,  Rhiannon Young CNP, Randell Daniel CNP,  Deepali Koch, MIR, Simin Caban, CNP, Ashley Tesfaye, CNP, Codi Townsend, CNP, Mikki Lennon CNP, Caitlin Oropeza PA-C, ARTURO AmezcuaC, Padmini Dang, ANABEL, Raoul Torrez, CNP,  Hilda Briseno, CNP, Tanvi Bryant, CNP,  Marina Austin, CNP, Janis Martinez, CNP         [facility]   IN-PATIENT SERVICE   [facility]    Progress Note    10/26/2024    12:12 PM    Name:   Rasheed Bryant  MRN:     [id number]     Acct:      [id number]   Room:   0166/0166-01   Day:  0  Admit Date:  10/23/2024 12:06 PM    PCP:   Paul Mujica APRN - CNP  Code Status:  Full Code    Subjective:     C/C:   Chief Complaint   Patient presents with    Fall    Dizziness       Patient seen on follow-up today.  He states he feels well this morning denies any chest pain, palpitations, shortness of breath.  He notes he had some palpitations yesterday when up walking around, planning to get up and walk around later today.    Brief History:     Is a 59-year-old male with past medical history of A-fib on Eliquis, heart failure, hypertension, CKD, DVT he was admitted for management of syncopal episode.  Initial workup in the ED notable for hypotension although there was discrepancy per chart review.  Imaging notable for right distal radius fracture,

## 2024-10-26 NOTE — PLAN OF CARE
Problem: Safety - Adult  Goal: Free from fall injury  10/26/2024 0051 by Kae Colin RN  Outcome: Progressing  10/25/2024 1830 by Edith Cat  Outcome: Progressing     Problem: Pain  Goal: Verbalizes/displays adequate comfort level or baseline comfort level  10/26/2024 0051 by Kae Colin RN  Outcome: Progressing  10/25/2024 1830 by Edith Cat  Outcome: Progressing     Problem: ABCDS Injury Assessment  Goal: Absence of physical injury  Outcome: Progressing

## 2024-10-26 NOTE — PROGRESS NOTES
10/23/24  1347   INR 1.2     ECHO 10/25/24    Left Ventricle: Left ventricular systolic function difficult to assess due to heart rhythm and heart rate. The EF by visual approximation is 50 - 55%. Left ventricle size is normal. Normal wall thickness. Unable to assess wall motion.    Mitral Valve: Trace regurgitation.    Left Atrium: Left atrium is mildly dilated.    Image quality is technically difficult. Technically difficult study due to patient's body habitus and technically difficult study due to patient's heart rhythm.    Objective:   Vitals: /79   Pulse 69   Temp 97 °F (36.1 °C) (Oral)   Resp 23   Ht 1.829 m (6')   Wt 117.9 kg (260 lb)   SpO2 100%   BMI 35.26 kg/m²   General appearance: alert and cooperative with exam  HEENT: Head: Normocephalic, no lesions, without obvious abnormality.  Neck: no JVD, trachea midline, no adenopathy  Lungs: Clear to auscultation  Heart: irregular rate and rhythm, s1/s2 auscultated, no murmurs  Abdomen: soft, non-tender, bowel sounds active  Extremities: no edema  Neurologic: not done        Assessment / Acute Cardiac Problems:   Afib RVR  Hypotensive episodes   Tachycardic with irregular rhythm  Hx of Chronic Afib rate controlled with Troprol  Hx of CHF    Patient Active Problem List:     Oth fracture of shaft of radius, left arm, init for clos fx     Head trauma, initial encounter     Chronic kidney disease     DVT (deep venous thrombosis) ([facility])     H/O fasciotomy     Hypertension     PAD (peripheral artery disease) ([facility])     Arthritis     UTI (urinary tract infection)     Anticoagulated     Chronic atrial fibrillation ([facility])     Nasal fracture     Syncope and collapse     Dizziness     Fall     Atrial fibrillation with rapid ventricular response ([facility])     Syncope      Plan of Treatment:   Atrial fibrillation.  Remains RVR with minimal activity.   Continue Amiodarone, Eliquis and Torpol    Keep K >4 and Mg >2   BP stable  If HR with modest control with activity, ok

## 2024-10-27 PROCEDURE — 6370000000 HC RX 637 (ALT 250 FOR IP): Performed by: NURSE PRACTITIONER

## 2024-10-27 PROCEDURE — 2580000003 HC RX 258: Performed by: NURSE PRACTITIONER

## 2024-10-27 PROCEDURE — 99232 SBSQ HOSP IP/OBS MODERATE 35: CPT | Performed by: STUDENT IN AN ORGANIZED HEALTH CARE EDUCATION/TRAINING PROGRAM

## 2024-10-27 PROCEDURE — G0378 HOSPITAL OBSERVATION PER HR: HCPCS

## 2024-10-27 PROCEDURE — 99232 SBSQ HOSP IP/OBS MODERATE 35: CPT | Performed by: NURSE PRACTITIONER

## 2024-10-27 RX ADMIN — Medication 100 MG: at 07:53

## 2024-10-27 RX ADMIN — SODIUM CHLORIDE, PRESERVATIVE FREE 10 ML: 5 INJECTION INTRAVENOUS at 07:54

## 2024-10-27 RX ADMIN — SODIUM CHLORIDE, PRESERVATIVE FREE 10 ML: 5 INJECTION INTRAVENOUS at 21:15

## 2024-10-27 RX ADMIN — PANTOPRAZOLE SODIUM 40 MG: 40 TABLET, DELAYED RELEASE ORAL at 07:53

## 2024-10-27 RX ADMIN — APIXABAN 5 MG: 5 TABLET, FILM COATED ORAL at 21:15

## 2024-10-27 RX ADMIN — METOPROLOL SUCCINATE 37.5 MG: 25 TABLET, EXTENDED RELEASE ORAL at 07:53

## 2024-10-27 RX ADMIN — TRAZODONE HYDROCHLORIDE 50 MG: 50 TABLET ORAL at 21:15

## 2024-10-27 RX ADMIN — APIXABAN 5 MG: 5 TABLET, FILM COATED ORAL at 07:53

## 2024-10-27 RX ADMIN — OXYCODONE HYDROCHLORIDE AND ACETAMINOPHEN 1 TABLET: 5; 325 TABLET ORAL at 07:53

## 2024-10-27 RX ADMIN — OXYCODONE HYDROCHLORIDE AND ACETAMINOPHEN 1 TABLET: 5; 325 TABLET ORAL at 13:40

## 2024-10-27 RX ADMIN — ROSUVASTATIN CALCIUM 20 MG: 20 TABLET, FILM COATED ORAL at 07:53

## 2024-10-27 RX ADMIN — BACITRACIN: 500 OINTMENT TOPICAL at 07:52

## 2024-10-27 RX ADMIN — FOLIC ACID 1 MG: 1 TABLET ORAL at 07:53

## 2024-10-27 RX ADMIN — OXYCODONE HYDROCHLORIDE AND ACETAMINOPHEN 1 TABLET: 5; 325 TABLET ORAL at 20:01

## 2024-10-27 RX ADMIN — AMIODARONE HYDROCHLORIDE 800 MG: 200 TABLET ORAL at 07:53

## 2024-10-27 RX ADMIN — DILTIAZEM HYDROCHLORIDE 30 MG: 30 TABLET, FILM COATED ORAL at 05:45

## 2024-10-27 ASSESSMENT — PAIN DESCRIPTION - ORIENTATION
ORIENTATION: LEFT

## 2024-10-27 ASSESSMENT — PAIN SCALES - GENERAL
PAINLEVEL_OUTOF10: 4
PAINLEVEL_OUTOF10: 6
PAINLEVEL_OUTOF10: 7
PAINLEVEL_OUTOF10: 8
PAINLEVEL_OUTOF10: 9
PAINLEVEL_OUTOF10: 8
PAINLEVEL_OUTOF10: 7
PAINLEVEL_OUTOF10: 2

## 2024-10-27 ASSESSMENT — PAIN - FUNCTIONAL ASSESSMENT: PAIN_FUNCTIONAL_ASSESSMENT: PREVENTS OR INTERFERES SOME ACTIVE ACTIVITIES AND ADLS

## 2024-10-27 ASSESSMENT — PAIN DESCRIPTION - LOCATION
LOCATION: ARM

## 2024-10-27 ASSESSMENT — PAIN DESCRIPTION - DESCRIPTORS
DESCRIPTORS: ACHING;THROBBING
DESCRIPTORS: SHARP;THROBBING

## 2024-10-27 ASSESSMENT — PAIN DESCRIPTION - PAIN TYPE: TYPE: ACUTE PAIN

## 2024-10-27 ASSESSMENT — PAIN DESCRIPTION - FREQUENCY: FREQUENCY: INTERMITTENT

## 2024-10-27 ASSESSMENT — PAIN DESCRIPTION - ONSET: ONSET: GRADUAL

## 2024-10-27 NOTE — PROGRESS NOTES
[facility]   Progress Note                   Date:   10/27/2024  Patient name: Rasheed Bryant  Date of admission:  10/23/2024 12:06 PM  MRN:   [id number]  YOB: 1965  PCP: Paul Mujica APRN - CNP    Reason for Admission: Syncope and collapse [R55]  Dizziness [R42]  Head trauma, initial encounter [S09.90XA]  Fall, initial encounter [W19.XXXA]  Syncope, unspecified syncope type [R55]    Subjective:       Clinical Changes / Abnormalities: Pt Pt seen and examined in the room.  Patient resting in bed. Pt denies any CP or sob.  Labs, vitals and tele reviewed-  A-fib continues with RVR with activity. Bradycardia noted over night to 38.    Medications:   Scheduled Meds:   metoprolol succinate  37.5 mg Oral BID    dilTIAZem  30 mg Oral 4 times per day    vitamin D  50,000 Units Oral Weekly    amiodarone  800 mg Oral BID    bacitracin   Topical BID    apixaban  5 mg Oral BID    rosuvastatin  20 mg Oral Daily    traZODone  50 mg Oral Nightly    sodium chloride flush  5-40 mL IntraVENous 2 times per day    sodium chloride flush  5-40 mL IntraVENous 2 times per day    thiamine  100 mg Oral Daily    pantoprazole  40 mg Oral QAM AC    lidocaine  1 patch TransDERmal Daily    folic acid  1 mg Oral Daily     Continuous Infusions:   sodium chloride      sodium chloride       CBC:   Recent Labs     10/25/24  0828 10/26/24  0455   WBC 5.6 6.3   HGB 11.3* 10.8*    135*     BMP:    Recent Labs     10/25/24  0828 10/26/24  0455    132*   K 3.6* 4.0    100   CO2 24 24   BUN 8 6   CREATININE 0.8 0.8   GLUCOSE 85 96     Hepatic:   No results for input(s): \"AST\", \"ALT\", \"BILITOT\", \"ALKPHOS\" in the last 72 hours.    Invalid input(s): \"ALB\"    Troponin:   No results for input(s): \"TROPHS\" in the last 72 hours.    BNP: No results for input(s): \"BNP\" in the last 72 hours.  Lipids: No results for input(s): \"CHOL\", \"HDL\" in the last 72 hours.    Invalid input(s): \"LDLCALCU\"  INR:   No results for  input(s): \"INR\" in the last 72 hours.    ECHO 10/25/24    Left Ventricle: Left ventricular systolic function difficult to assess due to heart rhythm and heart rate. The EF by visual approximation is 50 - 55%. Left ventricle size is normal. Normal wall thickness. Unable to assess wall motion.    Mitral Valve: Trace regurgitation.    Left Atrium: Left atrium is mildly dilated.    Image quality is technically difficult. Technically difficult study due to patient's body habitus and technically difficult study due to patient's heart rhythm.    Objective:   Vitals: /70   Pulse 56   Temp 97.6 °F (36.4 °C) (Oral)   Resp 12   Ht 1.829 m (6')   Wt 117.9 kg (260 lb)   SpO2 98%   BMI 35.26 kg/m²   General appearance: alert and cooperative with exam  HEENT: Head: Normocephalic, no lesions, without obvious abnormality.  Neck: no JVD, trachea midline, no adenopathy  Lungs: Clear to auscultation  Heart: irregular rate and rhythm, s1/s2 auscultated, no murmurs  Abdomen: soft, non-tender, bowel sounds active  Extremities: no edema  Neurologic: not done        Assessment / Acute Cardiac Problems:   Afib RVR  Hypotensive episodes   Tachycardic with irregular rhythm  Hx of Chronic Afib rate controlled with Troprol  Hx of CHF    Patient Active Problem List:     Oth fracture of shaft of radius, left arm, init for clos fx     Head trauma, initial encounter     Chronic kidney disease     DVT (deep venous thrombosis) ([facility])     H/O fasciotomy     Hypertension     PAD (peripheral artery disease) ([facility])     Arthritis     UTI (urinary tract infection)     Anticoagulated     Chronic atrial fibrillation ([facility])     Nasal fracture     Syncope and collapse     Dizziness     Fall     Atrial fibrillation with rapid ventricular response ([facility])     Syncope      Plan of Treatment:   Atrial fibrillation.  Remains RVR with minimal activity.   Continue Amiodarone, Diltiazem, Eliquis and Torpol    Keep K >4 and Mg >2   BP stable  If HR with modest

## 2024-10-27 NOTE — PROGRESS NOTES
ventral hernias. No evidence of a bowel obstruction. 4.  Numerous chronic appearing bilateral rib fractures.  A fracture of the left lateral T7 rib may be acute or subacute.  Recommend correlation with point tenderness on physical exam. 5.  Diffuse fatty infiltration of the stomach wall may relate to chronic gastritis. 6.  Horseshoe kidney, cardiomegaly, sigmoid diverticulosis, and other chronic findings as above. CT thoracic and lumbar spine: 1.  No acute osseous abnormality of the thoracic or lumbar spine.     CTA ABDOMEN PELVIS W CONTRAST    Result Date: 10/23/2024  CTA chest, abdomen, and pelvis: 1.  No aneurysm, dissection, or high-grade stenosis of the major vessels in the chest, abdomen, and pelvis. 2.  Supraumbilical Lopez hernia involving the transverse colon.  General surgery consultation is recommended. 3.  Diastasis recti with superimposed small bowel containing supraumbilical ventral hernia and multiple fat containing supraumbilical ventral hernias. No evidence of a bowel obstruction. 4.  Numerous chronic appearing bilateral rib fractures.  A fracture of the left lateral T7 rib may be acute or subacute.  Recommend correlation with point tenderness on physical exam. 5.  Diffuse fatty infiltration of the stomach wall may relate to chronic gastritis. 6.  Horseshoe kidney, cardiomegaly, sigmoid diverticulosis, and other chronic findings as above. CT thoracic and lumbar spine: 1.  No acute osseous abnormality of the thoracic or lumbar spine.     CT CERVICAL SPINE WO CONTRAST    Result Date: 10/23/2024  No acute abnormality of the cervical spine.  No fracture     CT HEAD WO CONTRAST    Result Date: 10/23/2024  No acute intracranial abnormality. Displaced left nasal bone fracture.     XR WRIST LEFT (MIN 3 VIEWS)    Result Date: 10/23/2024  Left wrist: 1. Acute impacted intra-articular fracture through the metaphysis of the distal radius. 2. Mild underlying osteoarthrosis. 3. Mild soft tissue swelling about  diltiazem 30 mg 4 times daily, Eliquis  -PT OT follow-up  -Discussed with nursing, patient's heart rate has been controlled this morning however dropped to the low 40s overnight, continue to monitor on current medications pending cardiology changes  -Potential discharge this afternoon if he tolerates therapy    BAR, resolved  -Resolved status post IV fluids and oral intake    Left distal radius fracture  -Outpatient follow-up with orthopedic surgery    Seventh rib fracture  Nasal bone fracture  -Evaluated by trauma surgery, no inpatient interventions planned  -Pain control as needed    UTI  -UA on admission notable for 20-50 white blood cells, many bacteria  -Urine culture not done  -On ceftriaxone (10/23 - ), 7 days of antibiotics     Randell Galdamez DO  10/27/2024  11:32 AM

## 2024-10-27 NOTE — PLAN OF CARE
Problem: Safety - Adult  Goal: Free from fall injury  10/27/2024 0259 by Kenyon Giron RN  Outcome: Progressing  10/26/2024 1806 by Edith Cat  Outcome: Progressing     Problem: Pain  Goal: Verbalizes/displays adequate comfort level or baseline comfort level  10/27/2024 0259 by Kenyon Giron RN  Outcome: Progressing  10/26/2024 1806 by Edith Cat  Outcome: Progressing     Problem: ABCDS Injury Assessment  Goal: Absence of physical injury  Outcome: Progressing

## 2024-10-27 NOTE — PROGRESS NOTES
Cardiology made aware of pt's HR dropping into the 30's-40's. Pt is asymptomatic, per cardiology okay to monitor for now.

## 2024-10-27 NOTE — PROGRESS NOTES
Occupational Therapy    [facility]  Occupational Therapy Not Seen Note    DATE: 10/27/2024    NAME: Rasheed Bryant  MRN: [id number]   : 1965      Patient not seen this date for Occupational Therapy due to:    Other: Pt sleeping upon therapist arrival with BP running. LUE BP 80/53. RN notified. Pt awakened and BP taken on RUE 91/57. Will check back as time allows.    Next Scheduled Treatment: 10/28    Electronically signed by JACKI Marquez on 10/27/2024 at 12:50 PM

## 2024-10-28 ENCOUNTER — OFFICE VISIT (OUTPATIENT)
Dept: OPERATING ROOM | Age: 59
End: 2024-10-28
Attending: INTERNAL MEDICINE

## 2024-10-28 ENCOUNTER — APPOINTMENT (OUTPATIENT)
Age: 59
DRG: 115 | End: 2024-10-28
Attending: INTERNAL MEDICINE
Payer: MEDICAID

## 2024-10-28 ENCOUNTER — HOSPITAL ENCOUNTER (OUTPATIENT)
Age: 59
Setting detail: OBSERVATION
Discharge: HOME | End: 2024-10-30
Attending: INTERNAL MEDICINE
Payer: MEDICAID

## 2024-10-28 VITALS — OXYGEN SATURATION: 98 %

## 2024-10-28 DIAGNOSIS — I48.91 ATRIAL FIBRILLATION WITH RAPID VENTRICULAR RESPONSE (HCC): ICD-10-CM

## 2024-10-28 DIAGNOSIS — I48.91 UNSPECIFIED ATRIAL FIBRILLATION (HCC): Primary | ICD-10-CM

## 2024-10-28 PROBLEM — I48.0 PAROXYSMAL ATRIAL FIBRILLATION (HCC): Status: ACTIVE | Noted: 2024-10-28

## 2024-10-28 LAB
ANION GAP SERPL CALCULATED.3IONS-SCNC: 12 MMOL/L (ref 9–16)
BASOPHILS # BLD: 0.03 K/UL (ref 0–0.2)
BASOPHILS NFR BLD: 0 % (ref 0–2)
BUN SERPL-MCNC: 6 MG/DL (ref 6–20)
CALCIUM SERPL-MCNC: 9 MG/DL (ref 8.6–10.4)
CHLORIDE SERPL-SCNC: 99 MMOL/L (ref 98–107)
CO2 SERPL-SCNC: 24 MMOL/L (ref 20–31)
CREAT SERPL-MCNC: 0.7 MG/DL (ref 0.7–1.2)
ECHO BSA: 2.45 M2
EOSINOPHIL # BLD: 0.16 K/UL (ref 0–0.44)
EOSINOPHILS RELATIVE PERCENT: 2 % (ref 1–4)
ERYTHROCYTE [DISTWIDTH] IN BLOOD BY AUTOMATED COUNT: 13.1 % (ref 11.8–14.4)
GFR, ESTIMATED: >90 ML/MIN/1.73M2
GLUCOSE SERPL-MCNC: 87 MG/DL (ref 74–99)
HCT VFR BLD AUTO: 36.7 % (ref 40.7–50.3)
HGB BLD-MCNC: 11.2 G/DL (ref 13–17)
IMM GRANULOCYTES # BLD AUTO: 0.03 K/UL (ref 0–0.3)
IMM GRANULOCYTES NFR BLD: 0 %
LYMPHOCYTES NFR BLD: 0.93 K/UL (ref 1.1–3.7)
LYMPHOCYTES RELATIVE PERCENT: 13 % (ref 24–43)
MCH RBC QN AUTO: 30.5 PG (ref 25.2–33.5)
MCHC RBC AUTO-ENTMCNC: 30.5 G/DL (ref 28.4–34.8)
MCV RBC AUTO: 100 FL (ref 82.6–102.9)
MONOCYTES NFR BLD: 0.78 K/UL (ref 0.1–1.2)
MONOCYTES NFR BLD: 11 % (ref 3–12)
NEUTROPHILS NFR BLD: 74 % (ref 36–65)
NEUTS SEG NFR BLD: 5.37 K/UL (ref 1.5–8.1)
NRBC BLD-RTO: 0 PER 100 WBC
PHOSPHATE SERPL-MCNC: 4 MG/DL (ref 2.5–4.5)
PLATELET # BLD AUTO: 163 K/UL (ref 138–453)
PMV BLD AUTO: 10.3 FL (ref 8.1–13.5)
POTASSIUM SERPL-SCNC: 3.8 MMOL/L (ref 3.7–5.3)
RBC # BLD AUTO: 3.67 M/UL (ref 4.21–5.77)
SODIUM SERPL-SCNC: 135 MMOL/L (ref 136–145)
WBC OTHER # BLD: 7.3 K/UL (ref 3.5–11.3)

## 2024-10-28 PROCEDURE — 1200000000 HC SEMI PRIVATE

## 2024-10-28 PROCEDURE — 93312 ECHO TRANSESOPHAGEAL: CPT

## 2024-10-28 PROCEDURE — 92960 CARDIOVERSION ELECTRIC EXT: CPT | Performed by: INTERNAL MEDICINE

## 2024-10-28 PROCEDURE — 6360000002 HC RX W HCPCS: Performed by: INTERNAL MEDICINE

## 2024-10-28 PROCEDURE — 6370000000 HC RX 637 (ALT 250 FOR IP): Performed by: NURSE PRACTITIONER

## 2024-10-28 PROCEDURE — 99232 SBSQ HOSP IP/OBS MODERATE 35: CPT | Performed by: STUDENT IN AN ORGANIZED HEALTH CARE EDUCATION/TRAINING PROGRAM

## 2024-10-28 PROCEDURE — 85025 COMPLETE CBC W/AUTO DIFF WBC: CPT

## 2024-10-28 PROCEDURE — 93312 ECHO TRANSESOPHAGEAL: CPT | Performed by: INTERNAL MEDICINE

## 2024-10-28 PROCEDURE — 5A2204Z RESTORATION OF CARDIAC RHYTHM, SINGLE: ICD-10-PCS | Performed by: INTERNAL MEDICINE

## 2024-10-28 PROCEDURE — 97116 GAIT TRAINING THERAPY: CPT

## 2024-10-28 PROCEDURE — 99152 MOD SED SAME PHYS/QHP 5/>YRS: CPT | Performed by: INTERNAL MEDICINE

## 2024-10-28 PROCEDURE — 97110 THERAPEUTIC EXERCISES: CPT

## 2024-10-28 PROCEDURE — 2580000003 HC RX 258: Performed by: NURSE PRACTITIONER

## 2024-10-28 PROCEDURE — 36415 COLL VENOUS BLD VENIPUNCTURE: CPT

## 2024-10-28 PROCEDURE — 99233 SBSQ HOSP IP/OBS HIGH 50: CPT | Performed by: NURSE PRACTITIONER

## 2024-10-28 PROCEDURE — 93325 DOPPLER ECHO COLOR FLOW MAPG: CPT | Performed by: INTERNAL MEDICINE

## 2024-10-28 PROCEDURE — 80048 BASIC METABOLIC PNL TOTAL CA: CPT

## 2024-10-28 PROCEDURE — 99153 MOD SED SAME PHYS/QHP EA: CPT | Performed by: INTERNAL MEDICINE

## 2024-10-28 PROCEDURE — 92960 CARDIOVERSION ELECTRIC EXT: CPT

## 2024-10-28 PROCEDURE — 84100 ASSAY OF PHOSPHORUS: CPT

## 2024-10-28 PROCEDURE — 6370000000 HC RX 637 (ALT 250 FOR IP): Performed by: INTERNAL MEDICINE

## 2024-10-28 RX ORDER — FENTANYL CITRATE 50 UG/ML
INJECTION, SOLUTION INTRAMUSCULAR; INTRAVENOUS PRN
Status: COMPLETED | OUTPATIENT
Start: 2024-10-28 | End: 2024-10-28

## 2024-10-28 RX ORDER — MIDAZOLAM HYDROCHLORIDE 1 MG/ML
INJECTION, SOLUTION INTRAMUSCULAR; INTRAVENOUS PRN
Status: COMPLETED | OUTPATIENT
Start: 2024-10-28 | End: 2024-10-28

## 2024-10-28 RX ORDER — LIDOCAINE HYDROCHLORIDE 20 MG/ML
SOLUTION OROPHARYNGEAL PRN
Status: COMPLETED | OUTPATIENT
Start: 2024-10-28 | End: 2024-10-28

## 2024-10-28 RX ADMIN — FOLIC ACID 1 MG: 1 TABLET ORAL at 08:51

## 2024-10-28 RX ADMIN — METOPROLOL SUCCINATE 37.5 MG: 25 TABLET, EXTENDED RELEASE ORAL at 22:05

## 2024-10-28 RX ADMIN — ROSUVASTATIN CALCIUM 20 MG: 20 TABLET, FILM COATED ORAL at 08:51

## 2024-10-28 RX ADMIN — SODIUM CHLORIDE, PRESERVATIVE FREE 10 ML: 5 INJECTION INTRAVENOUS at 20:50

## 2024-10-28 RX ADMIN — MIDAZOLAM 1 MG: 1 INJECTION INTRAMUSCULAR; INTRAVENOUS at 13:21

## 2024-10-28 RX ADMIN — FENTANYL CITRATE 25 MCG: 50 INJECTION, SOLUTION INTRAMUSCULAR; INTRAVENOUS at 13:37

## 2024-10-28 RX ADMIN — OXYCODONE HYDROCHLORIDE AND ACETAMINOPHEN 1 TABLET: 5; 325 TABLET ORAL at 10:03

## 2024-10-28 RX ADMIN — FENTANYL CITRATE 25 MCG: 50 INJECTION, SOLUTION INTRAMUSCULAR; INTRAVENOUS at 13:32

## 2024-10-28 RX ADMIN — BENZOCAINE, BUTAMBEN, AND TETRACAINE HYDROCHLORIDE 3 SPRAY: .028; .004; .004 AEROSOL, SPRAY TOPICAL at 13:17

## 2024-10-28 RX ADMIN — FENTANYL CITRATE 25 MCG: 50 INJECTION, SOLUTION INTRAMUSCULAR; INTRAVENOUS at 13:18

## 2024-10-28 RX ADMIN — BACITRACIN: 500 OINTMENT TOPICAL at 08:58

## 2024-10-28 RX ADMIN — OXYCODONE HYDROCHLORIDE AND ACETAMINOPHEN 1 TABLET: 5; 325 TABLET ORAL at 06:04

## 2024-10-28 RX ADMIN — TRAZODONE HYDROCHLORIDE 50 MG: 50 TABLET ORAL at 22:06

## 2024-10-28 RX ADMIN — LIDOCAINE HYDROCHLORIDE 15 ML: 20 SOLUTION ORAL; TOPICAL at 13:14

## 2024-10-28 RX ADMIN — OXYCODONE HYDROCHLORIDE AND ACETAMINOPHEN 1 TABLET: 5; 325 TABLET ORAL at 20:49

## 2024-10-28 RX ADMIN — OXYCODONE HYDROCHLORIDE AND ACETAMINOPHEN 1 TABLET: 5; 325 TABLET ORAL at 16:28

## 2024-10-28 RX ADMIN — AMIODARONE HYDROCHLORIDE 800 MG: 200 TABLET ORAL at 08:51

## 2024-10-28 RX ADMIN — AMIODARONE HYDROCHLORIDE 800 MG: 200 TABLET ORAL at 20:48

## 2024-10-28 RX ADMIN — FENTANYL CITRATE 25 MCG: 50 INJECTION, SOLUTION INTRAMUSCULAR; INTRAVENOUS at 13:21

## 2024-10-28 RX ADMIN — MIDAZOLAM 1 MG: 1 INJECTION INTRAMUSCULAR; INTRAVENOUS at 13:32

## 2024-10-28 RX ADMIN — DILTIAZEM HYDROCHLORIDE 30 MG: 30 TABLET, FILM COATED ORAL at 06:01

## 2024-10-28 RX ADMIN — BACITRACIN: 500 OINTMENT TOPICAL at 20:49

## 2024-10-28 RX ADMIN — Medication 100 MG: at 08:51

## 2024-10-28 RX ADMIN — METOPROLOL SUCCINATE 37.5 MG: 25 TABLET, EXTENDED RELEASE ORAL at 08:51

## 2024-10-28 RX ADMIN — PANTOPRAZOLE SODIUM 40 MG: 40 TABLET, DELAYED RELEASE ORAL at 06:01

## 2024-10-28 RX ADMIN — MIDAZOLAM 2 MG: 1 INJECTION INTRAMUSCULAR; INTRAVENOUS at 13:18

## 2024-10-28 RX ADMIN — APIXABAN 5 MG: 5 TABLET, FILM COATED ORAL at 20:49

## 2024-10-28 RX ADMIN — APIXABAN 5 MG: 5 TABLET, FILM COATED ORAL at 08:52

## 2024-10-28 RX ADMIN — SODIUM CHLORIDE, PRESERVATIVE FREE 10 ML: 5 INJECTION INTRAVENOUS at 08:52

## 2024-10-28 ASSESSMENT — PAIN SCALES - GENERAL
PAINLEVEL_OUTOF10: 5
PAINLEVEL_OUTOF10: 6
PAINLEVEL_OUTOF10: 8
PAINLEVEL_OUTOF10: 6
PAINLEVEL_OUTOF10: 3
PAINLEVEL_OUTOF10: 3
PAINLEVEL_OUTOF10: 8
PAINLEVEL_OUTOF10: 4

## 2024-10-28 ASSESSMENT — PAIN DESCRIPTION - LOCATION
LOCATION: ARM
LOCATION: ARM;KNEE
LOCATION: ARM
LOCATION: ARM

## 2024-10-28 ASSESSMENT — PAIN DESCRIPTION - ORIENTATION
ORIENTATION: LEFT
ORIENTATION: LEFT;RIGHT
ORIENTATION: LEFT
ORIENTATION: LEFT

## 2024-10-28 ASSESSMENT — PAIN DESCRIPTION - DESCRIPTORS
DESCRIPTORS: THROBBING
DESCRIPTORS: DISCOMFORT;TENDER
DESCRIPTORS: ACHING

## 2024-10-28 ASSESSMENT — PAIN - FUNCTIONAL ASSESSMENT: PAIN_FUNCTIONAL_ASSESSMENT: ACTIVITIES ARE NOT PREVENTED

## 2024-10-28 NOTE — PLAN OF CARE
Problem: Safety - Adult  Goal: Free from fall injury  10/28/2024 1616 by Codi Steele RN  Outcome: Progressing  10/28/2024 0454 by Ruth Castellon RN  Outcome: Progressing     Problem: Pain  Goal: Verbalizes/displays adequate comfort level or baseline comfort level  10/28/2024 1616 by Codi Steele RN  Outcome: Progressing  10/28/2024 0454 by Ruth Castellon RN  Outcome: Progressing     Problem: ABCDS Injury Assessment  Goal: Absence of physical injury  10/28/2024 1616 by Codi Steele RN  Outcome: Progressing  10/28/2024 0454 by Ruth Castellon RN  Outcome: Progressing     Problem: Skin/Tissue Integrity  Goal: Absence of new skin breakdown  Description: 1.  Monitor for areas of redness and/or skin breakdown  2.  Assess vascular access sites hourly  3.  Every 4-6 hours minimum:  Change oxygen saturation probe site  4.  Every 4-6 hours:  If on nasal continuous positive airway pressure, respiratory therapy assess nares and determine need for appliance change or resting period.  Outcome: Progressing

## 2024-10-28 NOTE — PLAN OF CARE
Problem: Safety - Adult  Goal: Free from fall injury  10/28/2024 0454 by Ruth Castellon RN  Outcome: Progressing  10/27/2024 1758 by Edith Cat  Outcome: Progressing     Problem: Pain  Goal: Verbalizes/displays adequate comfort level or baseline comfort level  10/28/2024 0454 by Ruth Castellon RN  Outcome: Progressing  10/27/2024 1758 by Edith Cat  Outcome: Progressing     Problem: ABCDS Injury Assessment  Goal: Absence of physical injury  Outcome: Progressing     Problem: Pain  Goal: Verbalizes/displays adequate comfort level or baseline comfort level  10/28/2024 0454 by Ruth Castellon RN  Outcome: Progressing  10/27/2024 1758 by Edith Cta  Outcome: Progressing     Problem: ABCDS Injury Assessment  Goal: Absence of physical injury  Outcome: Progressing

## 2024-10-28 NOTE — PROGRESS NOTES
[facility]   Progress Note                   Date:   10/28/2024  Patient name: Rasheed Bryant  Date of admission:  10/23/2024 12:06 PM  MRN:   [id number]  YOB: 1965  PCP: Paul Mujica APRN - CNP    Reason for Admission: Syncope and collapse [R55]  Dizziness [R42]  Head trauma, initial encounter [S09.90XA]  Fall, initial encounter [W19.XXXA]  Syncope, unspecified syncope type [R55]    Subjective:       Clinical Changes / Abnormalities: Pt Pt seen and examined in the room.  Patient resting in bed. Pt denies any CP or sob.  Labs, vitals and tele reviewed-  A-fib continues with RVR with activity and continues to get bradycardic throughout the night in the 30's and 40's   Medications:   Scheduled Meds:   metoprolol succinate  37.5 mg Oral BID    dilTIAZem  30 mg Oral 4 times per day    vitamin D  50,000 Units Oral Weekly    amiodarone  800 mg Oral BID    bacitracin   Topical BID    apixaban  5 mg Oral BID    rosuvastatin  20 mg Oral Daily    traZODone  50 mg Oral Nightly    sodium chloride flush  5-40 mL IntraVENous 2 times per day    sodium chloride flush  5-40 mL IntraVENous 2 times per day    thiamine  100 mg Oral Daily    pantoprazole  40 mg Oral QAM AC    lidocaine  1 patch TransDERmal Daily    folic acid  1 mg Oral Daily     Continuous Infusions:   sodium chloride      sodium chloride       CBC:   Recent Labs     10/26/24  0455   WBC 6.3   HGB 10.8*   *     BMP:    Recent Labs     10/26/24  0455   *   K 4.0      CO2 24   BUN 6   CREATININE 0.8   GLUCOSE 96     Hepatic:   No results for input(s): \"AST\", \"ALT\", \"BILITOT\", \"ALKPHOS\" in the last 72 hours.    Invalid input(s): \"ALB\"    Troponin:   No results for input(s): \"TROPHS\" in the last 72 hours.    BNP: No results for input(s): \"BNP\" in the last 72 hours.  Lipids: No results for input(s): \"CHOL\", \"HDL\" in the last 72 hours.    Invalid input(s): \"LDLCALCU\"  INR:   No results for input(s): \"INR\" in the last 72  hours.    ECHO 10/25/24    Left Ventricle: Left ventricular systolic function difficult to assess due to heart rhythm and heart rate. The EF by visual approximation is 50 - 55%. Left ventricle size is normal. Normal wall thickness. Unable to assess wall motion.    Mitral Valve: Trace regurgitation.    Left Atrium: Left atrium is mildly dilated.    Image quality is technically difficult. Technically difficult study due to patient's body habitus and technically difficult study due to patient's heart rhythm.    Objective:   Vitals: /74   Pulse 67   Temp 98.6 °F (37 °C) (Oral)   Resp 13   Ht 1.829 m (6')   Wt 117.9 kg (260 lb)   SpO2 97%   BMI 35.26 kg/m²   General appearance: alert and cooperative with exam  HEENT: Head: Normocephalic, no lesions, without obvious abnormality.  Neck: no JVD, trachea midline, no adenopathy  Lungs: Clear to auscultation  Heart: irregular rate and rhythm, s1/s2 auscultated, no murmurs  Abdomen: soft, non-tender, bowel sounds active  Extremities: no edema  Neurologic: not done        Assessment / Acute Cardiac Problems:   Afib RVR  Hypotensive episodes   Tachycardic with irregular rhythm  Hx of Chronic Afib rate controlled with Troprol  Hx of CHF    Patient Active Problem List:     Oth fracture of shaft of radius, left arm, init for clos fx     Head trauma, initial encounter     Chronic kidney disease     DVT (deep venous thrombosis) ([facility])     H/O fasciotomy     Hypertension     PAD (peripheral artery disease) ([facility])     Arthritis     UTI (urinary tract infection)     Anticoagulated     Chronic atrial fibrillation ([facility])     Nasal fracture     Syncope and collapse     Dizziness     Fall     Atrial fibrillation with rapid ventricular response ([facility])     Syncope      Plan of Treatment:   Atrial fibrillation.  Remains RVR with minimal activity.   Continue Amiodarone, Diltiazem, Eliquis and Torpol    Keep K >4 and Mg >2   BP stable  We discussed RENETTA/Cardioversion, procedure and

## 2024-10-28 NOTE — PROGRESS NOTES
[facility]  Office: [phone]  Arun Aguilera DO, Alex Muñoz DO, Peter Parham DO, Ferdinand Zaman DO, Mecca Gonzalez MD, Jennifer Choi MD, Lashawn Vick MD, Sonia Barboza MD,  Amaury Cano MD, Jami Hinojosa MD, Alfonso Moe MD,  Vicky Rodríguez DO, Shady Baez MD, Law Horton MD, Sunny Aguilera DO, Cindy Mccarthy MD,  Steve Roa DO, Shiela Mason MD, Rabia Cummings MD, Jaclyn Olivier MD, Zack Geiger MD,  Marco Wisdom MD, Papo Downey MD, Kyle Geronimo MD, Cassidy Santana MD, Camron Roberts MD, Sacha Booker MD, Randell Galdamez DO, Charbel Forrest MD, Shirley Waterhouse, CNP,  Rhiannon Young CNP, Randell Daniel CNP,  Deepali Koch, MIR, Simin Caban, CNP, Ashley Tesfyae, CNP, Codi Townsend, CNP, Mikki Lennon CNP, Caitlin Oropeza PA-C, Terese Santos PA-C, Padmini Dang, ANABEL, Raoul Torrez CNP,  Hilda Briseno, CNP, Tanvi Bryant, ANABEL,  Marina Austin, ANABEL, Janis Martinez, CNP         [facility]   IN-PATIENT SERVICE   [facility]    Progress Note    10/28/2024    11:14 AM    Name:   Rasheed Bryant  MRN:     [id number]     Acct:      [id number]   Room:   0166/0166-01   Day:  0  Admit Date:  10/23/2024 12:06 PM    PCP:   Paul Mujica APRN - CNP  Code Status:  Full Code    Subjective:     C/C:   Chief Complaint   Patient presents with    Fall    Dizziness       Patient seen on follow-up today.  He states he feels well this morning denies any chest pain, palpitations, shortness of breath.  Noted he felt a little lightheaded yesterday and did not work with therapy as a result.    Brief History:     Is a 59-year-old male with past medical history of A-fib on Eliquis, heart failure, hypertension, CKD, DVT he was admitted for management of syncopal episode.  Initial workup in the ED notable for hypotension although there was discrepancy per chart review.  Imaging notable for right distal radius fracture, displaced left nasal bone  swelling about the wrist. Right knee: No acute fracture or dislocation. Left knee: No acute fracture or dislocation.     XR KNEE RIGHT (3 VIEWS)    Result Date: 10/23/2024  Left wrist: 1. Acute impacted intra-articular fracture through the metaphysis of the distal radius. 2. Mild underlying osteoarthrosis. 3. Mild soft tissue swelling about the wrist. Right knee: No acute fracture or dislocation. Left knee: No acute fracture or dislocation.     XR KNEE LEFT (3 VIEWS)    Result Date: 10/23/2024  Left wrist: 1. Acute impacted intra-articular fracture through the metaphysis of the distal radius. 2. Mild underlying osteoarthrosis. 3. Mild soft tissue swelling about the wrist. Right knee: No acute fracture or dislocation. Left knee: No acute fracture or dislocation.       Physical Examination:        General: Alert and oriented, no acute distress  Neurologic: Awake, alert, and oriented X3, no focal weakness  Lungs: Clear to auscultation, no wheezing, non-labored respiration  Heart: Normal rate, regular rhythm, no murmur, gallop or edema  Abdomen: Soft, non-tender, non-distended, normal bowel sounds  Musculoskeletal: Normal strength, splint on left wrist    Assessment:        Hospital Problems             Last Modified POA    * (Principal) Head trauma, initial encounter 10/23/2024 Yes    Oth fracture of shaft of radius, left arm, init for clos fx 10/23/2024 Yes    Chronic kidney disease 10/23/2024 Yes    Hypertension 10/23/2024 Yes    UTI (urinary tract infection) 10/23/2024 Yes    Anticoagulated 10/23/2024 Yes    Chronic atrial fibrillation (HCC) 10/23/2024 Yes    Nasal fracture 10/23/2024 Yes    Syncope and collapse 10/23/2024 Yes    Dizziness 10/23/2024 Yes    Fall 10/23/2024 Yes    Atrial fibrillation with rapid ventricular response (HCC) 10/24/2024 Yes    Syncope 10/24/2024 Yes       Plan:        Dizziness, hypertension  A-fib with RVR  -Cardiology following  -On amiodarone 800 mg twice daily, metoprolol succinate

## 2024-10-28 NOTE — PLAN OF CARE
[facility]  INTENSIVE CARE PROGRESS NOTE       Patient: Hue Manrique Attending: Juan Antonio De La Torre MD   female, 81year old  Admit Date: 2021         NEW EVENTS:    Proned overnight      Assessment & Plan      Acute hypoxic respiratory failure secondary to COVID-19, on immuno-supressants for transplanted kidneys, chronic hepatitis B (on Entecavir), urine culture  Candida albicans,  1,3 beta-d-glucan positive,       CNS:  Lighten sedation when able  Cardiovascular:  Hemodynamic monitoring  Pulmonary:  Lung protection strategy, Ventilator support, Prone  GI:  GI prophylaxis  Renal:  Monitor UOP  ID:  Meropenem, Fluconazole per ID, Steroids, Blanchard change every 3-5 days, Interval renal US, Interval tacrolimus levels   Hematology/Oncology:  Lovenox on hold d/t thrombocytopenia, Hematology consulted, SCDs  Endocrine:  Glycemic control        PAST MEDICAL HISTORY    Past Medical History:   Diagnosis Date   â¢ Chronic kidney disease     s/p kidney transplant   â¢ Diabetes mellitus (CMS/HCC)    â¢ Essential (primary) hypertension    â¢ H/O kidney transplant    â¢ Nuclear sclerotic cataract of both eyes     trace NS   â¢ Osteoporosis, unspecified 2011       SURGICAL HISTORY    Past Surgical History:   Procedure Laterality Date   â¢ Av fistula placement     â¢  section, classic     â¢ Colonoscopy N/A 2017    Jacob- Repeat Colonoscopy in 10 years.     â¢ Colonoscopy remove lesions by snare  2005   â¢ Dexa bone density axial skeleton  11/10/2005   â¢ Dexa bone density axial skeleton  2011   â¢ Kidney transplant      2011   â¢ Mammo screening bilateral  2011   â¢ Pap smear,routine  2012       FAMILY HISTORY    Family History   Problem Relation Age of Onset   â¢ Diabetes Neg Hx    â¢ High blood pressure Neg Hx    â¢ Kidney disease Neg Hx    â¢ Cancer Neg Hx         pt  in a war       SOCIAL HISTORY    Social History     Tobacco Use   â¢ Smoking status: Never Received Page via PS form Dr Leslie recommending to move this pt to inpatient status.    Electronically signed by Camron Roberts MD on 10/28/2024 at 4:59 PM     Smoker   â¢ Smokeless tobacco: Never Used   Substance Use Topics   â¢ Alcohol use: No   â¢ Drug use: No       CURRENT MEDICATIONS    â¢ furosemide  40 mg Intravenous Daily   â¢ methylPREDNISolone (SOLU-Medrol) IV  80 mg Intravenous 3 times per day   â¢ entecavir  0.5 mg OG Tube Daily   â¢ polyethylene glycol  17 g OG Tube BID   â¢ senna  1 tablet OG Tube Nightly   â¢ famotidine  20 mg OG Tube Daily   â¢ TACROlimus  0.5 mg OG Tube Daily Tx   â¢ sodium chloride (PF)  10 mL Injection 3 times per day   â¢ bisacodyl  10 mg Rectal Daily   â¢ chlorhexidine gluconate  15 mL Swish & Spit 2 times per day   â¢ albuterol  10 puff Inhalation 4x Daily Resp   â¢ fluconazole (DIFLUCAN) IVPB  200 mg Intravenous Daily   â¢ insulin lispro   Subcutaneous 4 times per day   â¢ meropenem (MERREM) extended infusion IVPB  1 g Intravenous 3 times per day   â¢ insulin glargine  15 Units Subcutaneous Daily   â¢ nystatin  500,000 Units Swish & Swallow 4x Daily   â¢ [Held by provider] enoxaparin  40 mg Subcutaneous Q24H   â¢ sodium chloride (PF)  2 mL Intracatheter 2 times per day   â¢ Potassium Standard Replacement Protocol   Does not apply See Admin Instructions   â¢ Phosphorus Standard Replacement Protocol   Does not apply See Admin Instructions   â¢ Magnesium Standard Replacement Protocol   Does not apply See Admin Instructions   â¢ calcium carbonate-vitamin D  1 tablet Oral BID WC          CONTINUOUS INFUSIONS  â¢ propofol (DIPRIVAN) infusion Stopped (11/26/21 1500)   â¢ ketamine (KETALAR) 500 mg/250 mL in sodium chloride 0.9 % infusion     â¢ fentaNYL (SUBLIMAZE) 2,500 mcg/250 mL in sodium chloride 0.9 % infusion 100 mcg/hr (11/27/21 0000)   â¢ MIDAZolam (VERSED) 100 mg/100 mL in saline infusion 8 mg/hr (11/27/21 1102)   â¢ NORepinephrine (LEVOPHED) 8 mg/250 mL in sodium chloride 0.9 % infusion 2 mcg/min (11/27/21 0545)   â¢ dexMEDETomidine (PRECEDEX) 400 mcg/100 mL in sodium chloride 0.9 % infusion Stopped (11/24/21 0700)   â¢ sodium chloride 0.9% infusion 10 mL/hr at "11/24/21 1600   â¢ sodium chloride 0.9% infusion Stopped (11/24/21 1600)          PRN MEDICATIONS  docusate sodium-sennosides, sodium chloride (PF), sodium chloride (PF), heparin (porcine), chlorhexidine gluconate **AND** chlorhexidine gluconate, LORazepam, dextrose, dextrose, glucagon, dextrose, dextrose, hydrOXYzine, hydrALAZINE, labetalol, sodium chloride, sodium chloride, sodium chloride, ondansetron **OR** ondansetron, acetaminophen    ALLERGIES    ALLERGIES:   Allergen Reactions   â¢ Contrast Media Other (See Comments)     Kidney Transplant Recipient, call the abdominal Transplant clinic at [phone] prior to administering IV contrast           PHYSICAL EXAM    Vital 24 Hour Range Most Recent Value   Temperature Temp  Min: 96.8 Â°F (36 Â°C)  Max: 98.4 Â°F (36.9 Â°C) 97 Â°F (36.1 Â°C)   Pulse Pulse  Min: 55  Max: 107 62   Respiratory Resp  Min: 20  Max: 28 (!) 24   Blood Pressure BP  Min: 115/65  Max: 147/84 (!) 147/84   Pulse Oximetry SpO2  Min: 86 %  Max: 99 % (!) 87 %   Art.  BP Arterial Line BP  Min: 94/41   Min taken time: 11/27/21 0545  Max: 139/57   Max taken time: 11/27/21 0500 Arterial Line BP: 110/48   O2 No data recorded       Vital Most Recent Value First Value   Weight 53 kg (116 lb 13.5 oz) Weight: 56.1 kg (123 lb 11.2 oz)   Height 5' 2"" (157.5 cm) Height: 5' 2\"" (157.5 cm)   BMI 21.37 N/A         Limited d/t PAPR/ Prone    Neurologic:  Sedated  HEENT: Pupils equal  Chest:  Symmetric  Abdomen: Soft  Extremities: Edema present, mild      Labs      ABGs:    Recent Labs   Lab 11/27/21  0412 11/26/21  0500 11/25/21  0503   APH 7.32* 7.36 7.36   APCO2 46* 42 41   APO2 68* 60* 115*   AHCO3 23 23 23       Laboratory Results:    Recent Labs   Lab 11/27/21  0412 11/26/21  1539 11/26/21  0500 11/25/21  2238 11/25/21  1440 11/25/21  0503 11/25/21  0503 11/24/21  1523 11/24/21  0455 11/23/21  1524 11/23/21  0419 11/23/21  0419 11/22/21  1700 11/22/21  0552 11/22/21  0552   SODIUM 150*  --  150*  --   --   --  150*  " --  149* 146*   < > 155*   < >  --  156*   POTASSIUM 3.7  --  4.1  --   --   --  3.6  --  4.2  --   --  4.0   < >  --  4.7   CHLORIDE 120*  --  123*  --   --   --  121*  --  122*  --   --  125*   < >  --  125*   CO2 24  --  23  --   --   --  24  --  23  --   --  25   < >  --  24   BUN 38*  --  31*  --   --   --  22*  --  27*  --   --  40*   < >  --  47*   CREATININE 0.95  --  0.84  --   --   --  0.64  --  0.66  --   --  0.79   < >  --  0.77   GLUCOSE 146*  --  155*  --   --   --  117*  --  100*  --   --  104*   < >  --  97   AST  --   --   --   --   --   --   --   --  49*  --   --   --   --   --   --    GPT  --   --   --   --   --   --   --   --  49  --   --   --   --   --   --    BILIRUBIN  --   --   --   --   --   --   --   --  0.6  --   --   --   --   --   --    WBC 18.1*  --  24.2*  --   --   --  20.3*  --  30.3*  --   --   --   --   --  36.6*   HGB 7.3*  7.3* 7.7* 7.8* 7.6* 7.5*   < > 7.5*  7.5*   < > 8.6*  --   --   --   --    < > 14.0   HCT 22.7*  22.7* 24.1* 24.1* 23.7* 23.1*   < > 23.7*  23.7*   < > 26.4*  --   --   --   --    < > 41.3   PLT 23*  --  36*  --   --   --  47*  --  73*  --   --   --   --   --  118*    < > = values in this interval not displayed. PROBLEM LIST  Active Hospital Problems    *Acute hypoxemic respiratory failure due to COVID-19 (CMS/HCC)      Lactic acidosis      Acute kidney injury due to severe acute respiratory syndrome coronavirus 2 (SARS-CoV-2) disease (CMS/HCC)      Hepatitis B antibody positive      Sepsis due to COVID-19 (CMS/HCC)      Septic shock (CMS/HCC)      Pericardial effusion      Pyelonephritis, acute      Hypernatremia      Other constipation      DM (diabetes mellitus), type 2 (CMS/HCC)      Immunosuppression (CMS/HCC)      S/P kidney transplant           Critical care time was 30 minutes.        Mallika Jennings MD  11/27/2021  12:04 PM  Ext: [phone]

## 2024-10-28 NOTE — PROGRESS NOTES
TRANSFER - OUT REPORT:    Verbal report given to Codi MANCIA on Rasheed Bryant being transferred to  for routine progression of patient care       Report consisted of patient's Situation, Background, Assessment and   Recommendations(SBAR).     Information from the following report(s) Nurse Handoff Report was reviewed with the receiving nurse.    Opportunity for questions and clarification was provided.      Patient transported with:   Registered Nurse

## 2024-10-28 NOTE — PROGRESS NOTES
cane  Assistance: Contact guard assistance  Quality of Gait: reciprocal gait, reliance on cane, no LOB  Gait Deviations: Increased PAYAM;Decreased step length;Decreased step height  Distance: 100ft x2  Comments: Pt's heartrate reaching as high as 163bpm after ambulating with pt recovering fully when in bed. Pt also reported feeling SOB with pursed lip breathing instructions given.  More Ambulation?: No  Stairs/Curb  Stairs?: No     Balance  Posture: Fair  Sitting - Static: Good;-  Sitting - Dynamic: Fair;+  Standing - Static: Fair  Standing - Dynamic: Fair;-  Comments: SPC used while assessing standing balance  Exercise Treatment: Seated LE exercise program: Long Arc Quads, hip abduction/adduction, heel/toe raises, and marches. Reps:  x 15 reps            AM-PAC - Mobility    AM-PAC Basic Mobility - Inpatient   How much help is needed turning from your back to your side while in a flat bed without using bedrails?: None  How much help is needed moving from lying on your back to sitting on the side of a flat bed without using bedrails?: None  How much help is needed moving to and from a bed to a chair?: A Little  How much help is needed standing up from a chair using your arms?: A Lot  How much help is needed walking in hospital room?: A Little  How much help is needed climbing 3-5 steps with a railing?: A Lot  AM-PAC Inpatient Mobility Raw Score : 18  AM-PAC Inpatient T-Scale Score : 43.63  Mobility Inpatient CMS 0-100% Score: 46.58  Mobility Inpatient CMS G-Code Modifier : CK      Goals  Short Term Goals  Time Frame for Short Term Goals: 14 visits  Short Term Goal 1: Pt will be Julio bed mobility  Short Term Goal 2: Pt will be Julio transfers  Short Term Goal 3: Pt will be Julio amb 250' SPC  Short Term Goal 4: Pt will navigate 4 steps Julio       Education  Patient Education  Education Given To: Patient  Education Provided: Role of Therapy;Plan of Care;Transfer Training;Energy Conservation  Education Method:

## 2024-10-28 NOTE — PROGRESS NOTES
Patient admitted, consent signed prior to arrival to [facility]>  questions answered. Patient ready for procedure. Call light to reach with side rails up 2 of 2. Chest clipped with writer and Tracy present.  No family at bedside with patient.  History and physical comlete.

## 2024-10-28 NOTE — PROCEDURES
[facility]  Transesophageal Echocardiogram       Today's Date:  10/28/2024  Ordering Physician:  Tanvi WEATHERS  Indication:   Pre cardioversion    Operators:  Primary:   Dr Medina  (Attending Physician)  Assistant:   Pippa Patricia MD (Cardiovascular Fellow)    Pre Procedure Conscious Sedation Data:  ASA Class:    [] I [x] II [] III [] IV    Mallampati Class:  [] I [x] II [] III [] IV    Procedure:  Patient seen and examined. History and Physical reviewed. Labs reviewed.    After informed consent was obtained with explanation of the risks and benefits, the patient was brought to cardiac cath lab. All sedation was administered by the cardiologist. The oropharynx was pre-anesthesized with viscous lidocaine and cetacaine spray. The ultrasound probe was passed without any difficulty.    RENETTA findings:  LA:  Normal  REGGIE:  No thrombus  RA:  Normal  RV:   Normal  LV:  Normal  Estimated LVEF:  55%  Aorta:   Mild atheromatous disease arch  Pericardium: No pericardial effusion  Septum:  No intracardiac shunt via color Doppler.      Valves:    Mitral Valve: Structurally normal. Mild regurgitation is identified.  Aortic Valve: The aortic valve is trileaflet and opens adequately. Mild regurgitiation is identified.  Tricuspid valve: Structurally normal. No regurgitation is identified.    No valvular vegetations or thrombus identified.    RENETTA Summary:   1. A RENETTA was performed without complications.  2. LVEF 55 %  3. No thrombus or valvular vegetation identified  4. Proceed with Cardioversion    CARDIOVERSION:  After an adequate level of sedation was achieved, 200 J and 360 J in biphasic synchronized delivery was administered. no change in rhythm. Cardioversion was unsuccessful. The patient awoke without complications.      A post procedure 12 lead ECG was ordered. There were no complications encountered.    Impression:  Unsuccessful Cardioversion to NSR  Pt was already on amiodarone on floors. If patient

## 2024-10-28 NOTE — PROGRESS NOTES
Physical Therapy  Facility/Department: [address] BURN UNIT  Physical Therapy Treatment Note    Name: Rasheed Bryant  : 1965  MRN: [id number]  Date of Service: 10/28/2024    Discharge Recommendations:  Patient would benefit from continued therapy after discharge   PT Equipment Recommendations  Equipment Needed: No  Other: CTA, pt has cane      Patient Diagnosis(es): The primary encounter diagnosis was Syncope and collapse. Diagnoses of Fall, initial encounter, Dizziness, Syncope, unspecified syncope type, and Chronic a-fib (HCC) were also pertinent to this visit.  Past Medical History:  has a past medical history of CHF (congestive heart failure) (HCC), Hyperlipidemia, and Hypertension.  Past Surgical History:  has no past surgical history on file.    Assessment  Body Structures, Functions, Activity Limitations Requiring Skilled Therapeutic Intervention: Decreased functional mobility ;Decreased strength;Decreased safe awareness;Decreased balance;Increased pain  Assessment: Pt ambulated 100ft with SPC and CGA with no loss of balance. Pt is CGA for transfers. Pt HR at beginning of session ranged from 100-120 bpm and after ambulation went as high as 163bpm. Pt's HR went back to initial resting range after getting back into bed. Pt would benefit from therapy following d/c to improve tolerance to mobility.  Therapy Prognosis: Good  Activity Tolerance  Activity Tolerance: Patient limited by fatigue;Patient limited by endurance;Patient limited by pain    Plan  Physical Therapy Plan  General Plan: 6-7 times per week  Current Treatment Recommendations: Strengthening, Balance training, Gait training, Functional mobility training, Stair training, Transfer training, Endurance training, Home exercise program, Safety education & training, Patient/Caregiver education & training, Equipment evaluation, education, & procurement, Therapeutic activities  Safety Devices  Type of Devices: Call light within reach, Gait belt, Nurse      Education  Patient Education  Education Given To: Patient  Education Provided: Role of Therapy;Plan of Care;Transfer Training;Energy Conservation  Education Method: Demonstration;Verbal  Barriers to Learning: None  Education Outcome: Verbalized understanding;Demonstrated understanding      Therapy Time   Individual Concurrent Group Co-treatment   Time In 1515         Time Out 1543         Minutes 28         Timed Code Treatment Minutes: 23 Minutes       FRANCISCO MATHIS PTA

## 2024-10-29 VITALS
WEIGHT: 260 LBS | BODY MASS INDEX: 35.21 KG/M2 | SYSTOLIC BLOOD PRESSURE: 100 MMHG | DIASTOLIC BLOOD PRESSURE: 58 MMHG | HEIGHT: 72 IN | HEART RATE: 82 BPM | OXYGEN SATURATION: 95 % | RESPIRATION RATE: 18 BRPM | TEMPERATURE: 97.6 F

## 2024-10-29 PROCEDURE — 6370000000 HC RX 637 (ALT 250 FOR IP): Performed by: INTERNAL MEDICINE

## 2024-10-29 PROCEDURE — 6370000000 HC RX 637 (ALT 250 FOR IP): Performed by: NURSE PRACTITIONER

## 2024-10-29 PROCEDURE — 2580000003 HC RX 258: Performed by: NURSE PRACTITIONER

## 2024-10-29 PROCEDURE — 97116 GAIT TRAINING THERAPY: CPT

## 2024-10-29 PROCEDURE — 99233 SBSQ HOSP IP/OBS HIGH 50: CPT | Performed by: NURSE PRACTITIONER

## 2024-10-29 PROCEDURE — 99232 SBSQ HOSP IP/OBS MODERATE 35: CPT | Performed by: STUDENT IN AN ORGANIZED HEALTH CARE EDUCATION/TRAINING PROGRAM

## 2024-10-29 RX ORDER — OXYCODONE AND ACETAMINOPHEN 5; 325 MG/1; MG/1
1 TABLET ORAL EVERY 8 HOURS PRN
Qty: 9 TABLET | Refills: 0 | Status: SHIPPED | OUTPATIENT
Start: 2024-10-29 | End: 2024-11-01

## 2024-10-29 RX ORDER — METOPROLOL TARTRATE 50 MG
50 TABLET ORAL 2 TIMES DAILY
Qty: 60 TABLET | Refills: 3 | Status: SHIPPED | OUTPATIENT
Start: 2024-10-29

## 2024-10-29 RX ORDER — METOPROLOL TARTRATE 50 MG
50 TABLET ORAL 2 TIMES DAILY
Status: DISCONTINUED | OUTPATIENT
Start: 2024-10-29 | End: 2024-10-29 | Stop reason: HOSPADM

## 2024-10-29 RX ORDER — CEPHALEXIN 500 MG/1
500 CAPSULE ORAL EVERY 12 HOURS SCHEDULED
Qty: 4 CAPSULE | Refills: 0 | Status: SHIPPED | OUTPATIENT
Start: 2024-10-29 | End: 2024-10-31

## 2024-10-29 RX ORDER — CEPHALEXIN 500 MG/1
500 CAPSULE ORAL EVERY 12 HOURS SCHEDULED
Status: DISCONTINUED | OUTPATIENT
Start: 2024-10-29 | End: 2024-10-29 | Stop reason: HOSPADM

## 2024-10-29 RX ADMIN — FOLIC ACID 1 MG: 1 TABLET ORAL at 08:16

## 2024-10-29 RX ADMIN — ROSUVASTATIN CALCIUM 20 MG: 20 TABLET, FILM COATED ORAL at 08:15

## 2024-10-29 RX ADMIN — OXYCODONE HYDROCHLORIDE AND ACETAMINOPHEN 1 TABLET: 5; 325 TABLET ORAL at 13:07

## 2024-10-29 RX ADMIN — OXYCODONE HYDROCHLORIDE AND ACETAMINOPHEN 1 TABLET: 5; 325 TABLET ORAL at 08:15

## 2024-10-29 RX ADMIN — METOPROLOL TARTRATE 50 MG: 50 TABLET, FILM COATED ORAL at 08:15

## 2024-10-29 RX ADMIN — APIXABAN 5 MG: 5 TABLET, FILM COATED ORAL at 08:18

## 2024-10-29 RX ADMIN — SODIUM CHLORIDE, PRESERVATIVE FREE 10 ML: 5 INJECTION INTRAVENOUS at 08:17

## 2024-10-29 RX ADMIN — BACITRACIN: 500 OINTMENT TOPICAL at 08:18

## 2024-10-29 RX ADMIN — PANTOPRAZOLE SODIUM 40 MG: 40 TABLET, DELAYED RELEASE ORAL at 05:33

## 2024-10-29 RX ADMIN — OXYCODONE HYDROCHLORIDE AND ACETAMINOPHEN 1 TABLET: 5; 325 TABLET ORAL at 02:00

## 2024-10-29 RX ADMIN — Medication 100 MG: at 08:15

## 2024-10-29 ASSESSMENT — PAIN - FUNCTIONAL ASSESSMENT: PAIN_FUNCTIONAL_ASSESSMENT: PREVENTS OR INTERFERES SOME ACTIVE ACTIVITIES AND ADLS

## 2024-10-29 ASSESSMENT — PAIN SCALES - WONG BAKER
WONGBAKER_NUMERICALRESPONSE: HURTS A LITTLE BIT
WONGBAKER_NUMERICALRESPONSE: NO HURT
WONGBAKER_NUMERICALRESPONSE: HURTS A LITTLE BIT
WONGBAKER_NUMERICALRESPONSE: HURTS A LITTLE BIT

## 2024-10-29 ASSESSMENT — PAIN DESCRIPTION - LOCATION
LOCATION: RIB CAGE;ARM
LOCATION: ARM
LOCATION: RIB CAGE

## 2024-10-29 ASSESSMENT — PAIN DESCRIPTION - ORIENTATION
ORIENTATION: LEFT

## 2024-10-29 ASSESSMENT — PAIN DESCRIPTION - DESCRIPTORS
DESCRIPTORS: ACHING

## 2024-10-29 ASSESSMENT — PAIN SCALES - GENERAL
PAINLEVEL_OUTOF10: 1
PAINLEVEL_OUTOF10: 4
PAINLEVEL_OUTOF10: 1
PAINLEVEL_OUTOF10: 1
PAINLEVEL_OUTOF10: 6
PAINLEVEL_OUTOF10: 6

## 2024-10-29 NOTE — PLAN OF CARE
Problem: Safety - Adult  Goal: Free from fall injury  10/29/2024 0509 by Christine Santiago RN  Outcome: Progressing  10/28/2024 1616 by Codi Steele RN  Outcome: Progressing     Problem: Pain  Goal: Verbalizes/displays adequate comfort level or baseline comfort level  10/29/2024 0509 by Christine Santiago RN  Outcome: Progressing  10/28/2024 1616 by Codi Steele RN  Outcome: Progressing     Problem: ABCDS Injury Assessment  Goal: Absence of physical injury  10/29/2024 0509 by Christine Santiago RN  Outcome: Progressing  10/28/2024 1616 by Codi Steele RN  Outcome: Progressing     Problem: Skin/Tissue Integrity  Goal: Absence of new skin breakdown  Description: 1.  Monitor for areas of redness and/or skin breakdown  2.  Assess vascular access sites hourly  3.  Every 4-6 hours minimum:  Change oxygen saturation probe site  4.  Every 4-6 hours:  If on nasal continuous positive airway pressure, respiratory therapy assess nares and determine need for appliance change or resting period.  10/29/2024 0509 by Christine Santiago RN  Outcome: Progressing  10/28/2024 1616 by Codi Steele RN  Outcome: Progressing

## 2024-10-29 NOTE — PLAN OF CARE
Problem: Safety - Adult  Goal: Free from fall injury  10/29/2024 0928 by Maureen Dumont RN  Outcome: Progressing  10/29/2024 0509 by Christine Santiago RN  Outcome: Progressing     Problem: Pain  Goal: Verbalizes/displays adequate comfort level or baseline comfort level  10/29/2024 0928 by Maureen Dumont RN  Outcome: Progressing  10/29/2024 0509 by Christine Santiago RN  Outcome: Progressing     Problem: ABCDS Injury Assessment  Goal: Absence of physical injury  10/29/2024 0928 by Maureen Dumont RN  Outcome: Progressing  10/29/2024 0509 by Christine Santiago RN  Outcome: Progressing     Problem: Skin/Tissue Integrity  Goal: Absence of new skin breakdown  Description: 1.  Monitor for areas of redness and/or skin breakdown  2.  Assess vascular access sites hourly  3.  Every 4-6 hours minimum:  Change oxygen saturation probe site  4.  Every 4-6 hours:  If on nasal continuous positive airway pressure, respiratory therapy assess nares and determine need for appliance change or resting period.  10/29/2024 0928 by Maureen Dumont RN  Outcome: Progressing  10/29/2024 0509 by Christine Santiago RN  Outcome: Progressing

## 2024-10-29 NOTE — PROGRESS NOTES
assistance  Stand to Sit: Contact guard assistance  Comment: Assessed with SPC  Ambulation  Surface: Level tile  Device: Single point cane  Assistance: Contact guard assistance  Quality of Gait: reciprocal gait, reliance on cane,  slightly unsteady, no LOB  Gait Deviations: Increased PAYAM;Decreased step length;Decreased step height  Distance: 120ft x2  More Ambulation?: No  Stairs/Curb  Stairs?: Yes  Stairs  # Steps : 2 (2 trials)  Stairs Height: 6\"  Rails: Bilateral  Device: No Device  Assistance: Contact guard assistance  Comment: pt would descend steps going sideways.     Balance  Posture: Fair  Sitting - Static: Good;-  Sitting - Dynamic: Fair;+  Standing - Static: Fair  Standing - Dynamic: Fair  Comments: SPC used while assessing standing balance  Exercise Treatment: ankle pumps, LAQ          AM-PAC - Mobility    AM-PAC Basic Mobility - Inpatient   How much help is needed turning from your back to your side while in a flat bed without using bedrails?: None  How much help is needed moving from lying on your back to sitting on the side of a flat bed without using bedrails?: None  How much help is needed moving to and from a bed to a chair?: None  How much help is needed standing up from a chair using your arms?: None  How much help is needed walking in hospital room?: A Little  How much help is needed climbing 3-5 steps with a railing?: A Little  AM-[facility] Inpatient Mobility Raw Score : 22  AM-PAC Inpatient T-Scale Score : 53.28  Mobility Inpatient CMS 0-100% Score: 20.91  Mobility Inpatient CMS G-Code Modifier : CJ      Goals  Short Term Goals  Time Frame for Short Term Goals: 14 visits  Short Term Goal 1: Pt will be Julio bed mobility  Short Term Goal 2: Pt will be Julio transfers  Short Term Goal 3: Pt will be Julio amb 250' SPC  Short Term Goal 4: Pt will navigate 4 steps Julio       Education  Patient Education  Education Given To: Patient  Education Provided: Role of Therapy;Plan of Care;Transfer Training;Energy  Conservation  Education Method: Demonstration;Verbal  Barriers to Learning: None  Education Outcome: Verbalized understanding;Demonstrated understanding      Therapy Time   Individual Concurrent Group Co-treatment   Time In 1447         Time Out 1503         Minutes 16         Timed Code Treatment Minutes: 10 Minutes       FRANCISCO MATHIS PTA

## 2024-10-29 NOTE — PROGRESS NOTES
2220- RN entered room to change patients bed sheets and get patient adjusted for bed. RN asked patient if he wanted to stand or roll, patient stated he wanted to stand. RN then educated patient on the plan of getting up and to not put any weight on L wrist as he is NWB and that they were going to use a walker. Patient became impulsive and pushed up out of bed and stood without waiting on RN and other RN that came to room to help. Patient then sat in chair and when returning to the bed was impulsive and used both hands/wrist to push himself up out of the chair instead of letting both RN's help him return with a walker. Patient returned to bed with no complaints of pain.

## 2024-10-29 NOTE — PROGRESS NOTES
[facility]   Progress Note                   Date:   10/29/2024  Patient name: Rasheed Bryant  Date of admission:  10/23/2024 12:06 PM  MRN:   [id number]  YOB: 1965  PCP: Paul Mujica APRN - CNP    Reason for Admission: Syncope and collapse [R55]  Dizziness [R42]  Head trauma, initial encounter [S09.90XA]  Fall, initial encounter [W19.XXXA]  Syncope, unspecified syncope type [R55]  Atrial fibrillation with RVR (HCC) [I48.91]    Subjective:       Clinical Changes / Abnormalities: Pt Pt seen and examined in the room.  Patient resting in bed. Pt denies any CP or sob.  Labs, vitals and tele reviewed-  A-fib   S/P unsuccessful RENETTA/CV     Medications:   Scheduled Meds:   metoprolol tartrate  50 mg Oral BID    vitamin D  50,000 Units Oral Weekly    bacitracin   Topical BID    apixaban  5 mg Oral BID    rosuvastatin  20 mg Oral Daily    traZODone  50 mg Oral Nightly    sodium chloride flush  5-40 mL IntraVENous 2 times per day    sodium chloride flush  5-40 mL IntraVENous 2 times per day    thiamine  100 mg Oral Daily    pantoprazole  40 mg Oral QAM AC    lidocaine  1 patch TransDERmal Daily    folic acid  1 mg Oral Daily     Continuous Infusions:   sodium chloride      sodium chloride       CBC:   Recent Labs     10/28/24  0819   WBC 7.3   HGB 11.2*        BMP:    Recent Labs     10/28/24  0819   *   K 3.8   CL 99   CO2 24   BUN 6   CREATININE 0.7   GLUCOSE 87     Hepatic:   No results for input(s): \"AST\", \"ALT\", \"BILITOT\", \"ALKPHOS\" in the last 72 hours.    Invalid input(s): \"ALB\"    Troponin:   No results for input(s): \"TROPHS\" in the last 72 hours.    BNP: No results for input(s): \"BNP\" in the last 72 hours.  Lipids: No results for input(s): \"CHOL\", \"HDL\" in the last 72 hours.    Invalid input(s): \"LDLCALCU\"  INR:   No results for input(s): \"INR\" in the last 72 hours.    ECHO 10/25/24    Left Ventricle: Left ventricular systolic function difficult to assess due to heart

## 2024-10-29 NOTE — PLAN OF CARE
Problem: Safety - Adult  Goal: Free from fall injury  10/29/2024 1647 by Maureen Dumont RN  Outcome: Adequate for Discharge  10/29/2024 0928 by Maureen Dumont RN  Outcome: Progressing  10/29/2024 0509 by Christine Santiago RN  Outcome: Progressing     Problem: Pain  Goal: Verbalizes/displays adequate comfort level or baseline comfort level  10/29/2024 1647 by Maureen Dumont RN  Outcome: Adequate for Discharge  10/29/2024 0928 by Maureen Dumont RN  Outcome: Progressing  10/29/2024 0509 by Christine Santiago RN  Outcome: Progressing     Problem: ABCDS Injury Assessment  Goal: Absence of physical injury  10/29/2024 1647 by Maureen Dumont RN  Outcome: Adequate for Discharge  10/29/2024 0928 by Maureen Dumont RN  Outcome: Progressing  10/29/2024 0509 by Christine Santiago RN  Outcome: Progressing     Problem: Skin/Tissue Integrity  Goal: Absence of new skin breakdown  Description: 1.  Monitor for areas of redness and/or skin breakdown  2.  Assess vascular access sites hourly  3.  Every 4-6 hours minimum:  Change oxygen saturation probe site  4.  Every 4-6 hours:  If on nasal continuous positive airway pressure, respiratory therapy assess nares and determine need for appliance change or resting period.  10/29/2024 1647 by Maureen Dumont RN  Outcome: Adequate for Discharge  10/29/2024 0928 by Maureen Dumont RN  Outcome: Progressing  10/29/2024 0509 by Christine Santiago RN  Outcome: Progressing
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
## 2024-10-29 NOTE — PROGRESS NOTES
[facility]  Office: [phone]  Arun Aguilera DO, Alex Muñoz DO, Peter Parham DO, Ferdinand Zaman DO, Mecca Gonzalez MD, Jennifer Choi MD, Lashawn Vick MD, Sonia Barboza MD,  Amaury Cano MD, Jami Hinojosa MD, Alfonso Moe MD,  Vicky Rodríguez DO, Shady Baez MD, Law Horton MD, Sunny Aguilera DO, Cindy Mccarthy MD,  Steve Roa DO, Shiela Mason MD, Rabia Cummings MD, Jaclyn Olivier MD, Zack Geiger MD,  Marco Wisdom MD, Papo Downey MD, Kyle Geronimo MD, Cassidy Santana MD, Camron Roberts MD, Sacha Booker MD, Randell Galdamez DO, Charbel Forrest MD, Shirley Waterhouse, CNP,  Rhiannon Young CNP, Randell Daniel, ANABEL,  Deepali Koch, MIR, iSmin Caban, CNP, Ashley Tesfaye, CNP, Codi Townsend, CNP, Mikki Lennon, CNP, Caitlin Oropeza PA-C, ARTURO AmezcuaC, Padmini Dang, ANABEL, Raoul Torrez, CNP,  Hilda Briseno, CNP, Tanvi Bryant, CNP,  Marina Austin, CNP, Janis Martinez, CNP         [facility]   IN-PATIENT SERVICE   [facility]    Progress Note    10/29/2024    7:54 AM    Name:   Rasheed Bryant  MRN:     [id number]     Acct:      [id number]   Room:   0166/0166-01   Day:  1  Admit Date:  10/23/2024 12:06 PM    PCP:   Paul Mujica APRN - CNP  Code Status:  Full Code    Subjective:     C/C:   Chief Complaint   Patient presents with    Fall    Dizziness     Interval History Status: improved.     Vitals reviewed, afebrile and hemodynamically stable.  Saturating well on 2 L nasal cannula.  Labs reviewed, previously stable.  Overnight patient had no significant events.    On examination patient resting comfortably in bed.  Eager for discharge.  Denies any complaints aside from chronic shortness of breath.    Brief History:     This is a 59-year-old male with past medical history of A-fib on Eliquis, heart failure, hypertension, CKD, DVT he was admitted for management of syncopal episode. Initial workup in the ED notable for

## 2024-10-30 NOTE — DISCHARGE SUMMARY
[facility]  Office: [phone]  Arun Aguilera DO, Alex Muñoz DO, Peter Parham DO, Ferdinand Zaman DO, Mecca Gonzalez MD, Jennifer Choi MD, Lashawn Vick MD, Sonia Barboza MD,  Amaury Cano MD, Jami Hinojosa MD, Alfonso Moe MD,  Vicky Rodríguez DO, Shady Baez MD, Law Horton MD, Sunny Aguilera DO, Cindy Mccarthy MD,  Steve Roa DO, Shiela Mason MD, Rabia Cummings MD, Jaclyn Olivier MD, Zack Geiger MD,  Marco Wisdom MD, Papo Downey MD, Kyle Geronimo MD, Cassidy Santana MD, Camron Roberts MD, Sacha Booker MD, Randell Galdamez DO, Charbel Forrest MD, Shirley Waterhouse, CNP,  Rhiannon Young CNP, Randell Daniel, ANABEL,  Deepali Koch, MIR, Simin Caban, CNP, Ashley Tesfaye, CNP, Codi Townsend, CNP, Mikki Lennon, CNP, Caitlin Oropeza PA-C, Terese Sanots PA-C, Padmini Dang, ANABEL, Raoul Torrez, ANABEL,  Hilda Briseno, CNP, Tanvi Bryant, ANABEL,  Mraina Austin, ANABEL, Janis Martinez, CNP         [facility]   IN-PATIENT SERVICE   [facility]    Discharge Summary     Patient ID: Rasheed Bryant  :  1965   MRN: [id number]     ACCOUNT:  [id number]   Patient's PCP: Paul Mujica APRN - CNP  Admit Date: 10/23/2024   Discharge Date: 10/29/2024   Length of Stay: 1  Code Status:  Prior  Admitting Physician: Steve Roa DO  Discharge Physician: Steve Roa DO     Active Discharge Diagnoses:     Hospital Problem Lists:  Principal Problem:    Head trauma, initial encounter  Active Problems:    Oth fracture of shaft of radius, left arm, init for clos fx    Chronic kidney disease    Hypertension    UTI (urinary tract infection)    Anticoagulated    Chronic atrial fibrillation (HCC)    Nasal fracture    Syncope and collapse    Dizziness    Fall    Atrial fibrillation with rapid ventricular response (HCC)    Syncope    Atrial fibrillation with RVR ([facility])  Resolved Problems:    * No resolved hospital problems. *    Admission Condition:

## 2024-11-22 PROBLEM — N39.0 UTI (URINARY TRACT INFECTION): Status: RESOLVED | Noted: 2024-10-23 | Resolved: 2024-11-22
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
## 2025-04-17 ENCOUNTER — APPOINTMENT (OUTPATIENT)
Dept: CT IMAGING | Age: 60
DRG: 422 | End: 2025-04-17
Payer: MEDICAID

## 2025-04-17 ENCOUNTER — APPOINTMENT (OUTPATIENT)
Dept: ULTRASOUND IMAGING | Age: 60
DRG: 422 | End: 2025-04-17
Payer: MEDICAID

## 2025-04-17 ENCOUNTER — APPOINTMENT (OUTPATIENT)
Dept: GENERAL RADIOLOGY | Age: 60
DRG: 422 | End: 2025-04-17
Payer: MEDICAID

## 2025-04-17 ENCOUNTER — HOSPITAL ENCOUNTER (INPATIENT)
Age: 60
LOS: 6 days | Discharge: HOME | DRG: 422 | End: 2025-04-23
Attending: EMERGENCY MEDICINE | Admitting: STUDENT IN AN ORGANIZED HEALTH CARE EDUCATION/TRAINING PROGRAM
Payer: MEDICAID

## 2025-04-17 DIAGNOSIS — R07.9 CHEST PAIN, UNSPECIFIED TYPE: Primary | ICD-10-CM

## 2025-04-17 DIAGNOSIS — R06.02 SHORTNESS OF BREATH: ICD-10-CM

## 2025-04-17 DIAGNOSIS — N17.9 AKI (ACUTE KIDNEY INJURY): ICD-10-CM

## 2025-04-17 DIAGNOSIS — E87.6 HYPOKALEMIA: ICD-10-CM

## 2025-04-17 DIAGNOSIS — I20.9 ANGINA PECTORIS: ICD-10-CM

## 2025-04-17 DIAGNOSIS — Z79.01 ANTICOAGULATED: ICD-10-CM

## 2025-04-17 DIAGNOSIS — R55 SYNCOPE AND COLLAPSE: ICD-10-CM

## 2025-04-17 DIAGNOSIS — N17.9 ACUTE RENAL FAILURE, UNSPECIFIED ACUTE RENAL FAILURE TYPE: ICD-10-CM

## 2025-04-17 DIAGNOSIS — R42 DIZZINESS: ICD-10-CM

## 2025-04-17 DIAGNOSIS — E87.1 HYPONATREMIA: ICD-10-CM

## 2025-04-17 PROBLEM — R65.21 SEPTIC SHOCK (HCC): Status: ACTIVE | Noted: 2025-04-17

## 2025-04-17 PROBLEM — A41.9 SEPTIC SHOCK (HCC): Status: ACTIVE | Noted: 2025-04-17

## 2025-04-17 PROBLEM — R57.1 HYPOVOLEMIC SHOCK (HCC): Status: ACTIVE | Noted: 2025-04-17

## 2025-04-17 LAB
ALBUMIN SERPL-MCNC: 3.5 G/DL (ref 3.5–5.2)
ALBUMIN/GLOB SERPL: 1.1 {RATIO} (ref 1–2.5)
ALLEN TEST: POSITIVE
ALP SERPL-CCNC: 74 U/L (ref 40–129)
ALT SERPL-CCNC: 66 U/L (ref 10–50)
ANION GAP SERPL CALCULATED.3IONS-SCNC: 16 MMOL/L (ref 9–16)
ANION GAP SERPL CALCULATED.3IONS-SCNC: 36 MMOL/L (ref 9–16)
AST SERPL-CCNC: 100 U/L (ref 10–50)
B-OH-BUTYR SERPL-MCNC: 0.19 MMOL/L (ref 0.02–0.27)
BACTERIA URNS QL MICRO: ABNORMAL
BASOPHILS # BLD: <0.03 K/UL (ref 0–0.2)
BASOPHILS NFR BLD: 0 % (ref 0–2)
BILIRUB SERPL-MCNC: 1 MG/DL (ref 0–1.2)
BILIRUB UR QL STRIP: NEGATIVE
BUN BLD-MCNC: 19 MG/DL (ref 8–26)
BUN SERPL-MCNC: 17 MG/DL (ref 6–20)
BUN SERPL-MCNC: 21 MG/DL (ref 6–20)
C3 SERPL-MCNC: 104 MG/DL (ref 90–180)
C4 SERPL-MCNC: 24 MG/DL (ref 10–40)
CA-I BLD-SCNC: 0.95 MMOL/L (ref 1.13–1.33)
CA-I BLD-SCNC: 0.99 MMOL/L (ref 1.15–1.33)
CALCIUM SERPL-MCNC: 8.6 MG/DL (ref 8.6–10.4)
CALCIUM SERPL-MCNC: 9.2 MG/DL (ref 8.6–10.4)
CASTS #/AREA URNS LPF: ABNORMAL /LPF (ref 0–2)
CHLORIDE BLD-SCNC: 74 MMOL/L (ref 98–107)
CHLORIDE SERPL-SCNC: 68 MMOL/L (ref 98–107)
CHLORIDE SERPL-SCNC: 86 MMOL/L (ref 98–107)
CK SERPL-CCNC: 1603 U/L (ref 39–308)
CLARITY UR: CLEAR
CO2 BLD CALC-SCNC: 26 MMOL/L (ref 22–30)
CO2 SERPL-SCNC: 21 MMOL/L (ref 20–31)
CO2 SERPL-SCNC: 28 MMOL/L (ref 20–31)
COLOR UR: YELLOW
CORTIS SERPL-MCNC: 20 UG/DL (ref 2.5–19.5)
CREAT SERPL-MCNC: 2.7 MG/DL (ref 0.7–1.2)
CREAT SERPL-MCNC: 4 MG/DL (ref 0.7–1.2)
CREAT UR-MCNC: 48.3 MG/DL (ref 39–259)
EGFR, POC: 17 ML/MIN/1.73M2
EOSINOPHIL # BLD: <0.03 K/UL (ref 0–0.44)
EOSINOPHILS RELATIVE PERCENT: 0 % (ref 1–4)
EPI CELLS #/AREA URNS HPF: ABNORMAL /HPF (ref 0–5)
ERYTHROCYTE [DISTWIDTH] IN BLOOD BY AUTOMATED COUNT: 12.2 % (ref 11.8–14.4)
FLUAV RNA RESP QL NAA+PROBE: NOT DETECTED
FLUBV RNA RESP QL NAA+PROBE: NOT DETECTED
FREE KAPPA/LAMBDA RATIO: 1.21 (ref 0.22–1.74)
GFR, ESTIMATED: 17 ML/MIN/1.73M2
GFR, ESTIMATED: 27 ML/MIN/1.73M2
GLUCOSE BLD-MCNC: 104 MG/DL (ref 74–100)
GLUCOSE BLD-MCNC: 82 MG/DL (ref 74–100)
GLUCOSE SERPL-MCNC: 100 MG/DL (ref 74–99)
GLUCOSE SERPL-MCNC: 89 MG/DL (ref 74–99)
GLUCOSE UR STRIP-MCNC: NEGATIVE MG/DL
HAV IGM SERPL QL IA: NONREACTIVE
HBV CORE IGM SERPL QL IA: NONREACTIVE
HBV SURFACE AG SERPL QL IA: NONREACTIVE
HCO3 VENOUS: 27.3 MMOL/L (ref 22–29)
HCT VFR BLD AUTO: 39.9 % (ref 40.7–50.3)
HCT VFR BLD AUTO: 43 % (ref 41–53)
HCV AB SERPL QL IA: NONREACTIVE
HGB BLD-MCNC: 13.3 G/DL (ref 13–17)
HGB UR QL STRIP.AUTO: ABNORMAL
IMM GRANULOCYTES # BLD AUTO: 0.12 K/UL (ref 0–0.3)
IMM GRANULOCYTES NFR BLD: 1 %
INR PPP: 1.3
KAPPA LC FREE SER-MCNC: 79.6 MG/L
KETONES UR STRIP-MCNC: NEGATIVE MG/DL
LACTIC ACID, SEPSIS WHOLE BLOOD: 11.6 MMOL/L (ref 0.5–1.9)
LACTIC ACID, SEPSIS WHOLE BLOOD: 3.8 MMOL/L (ref 0.5–1.9)
LACTIC ACID, WHOLE BLOOD: 1.4 MMOL/L (ref 0.7–2.1)
LAMBDA LC FREE SERPL-MCNC: 65.9 MG/L (ref 4.2–27.7)
LEUKOCYTE ESTERASE UR QL STRIP: NEGATIVE
LYMPHOCYTES NFR BLD: 1.82 K/UL (ref 1.1–3.7)
LYMPHOCYTES RELATIVE PERCENT: 14 % (ref 24–43)
MAGNESIUM SERPL-MCNC: 2.5 MG/DL (ref 1.6–2.6)
MAGNESIUM SERPL-MCNC: 2.6 MG/DL (ref 1.6–2.6)
MAGNESIUM SERPL-MCNC: 2.7 MG/DL (ref 1.6–2.6)
MCH RBC QN AUTO: 29.9 PG (ref 25.2–33.5)
MCHC RBC AUTO-ENTMCNC: 33.3 G/DL (ref 28.4–34.8)
MCV RBC AUTO: 89.7 FL (ref 82.6–102.9)
MONOCYTES NFR BLD: 1 K/UL (ref 0.1–1.2)
MONOCYTES NFR BLD: 8 % (ref 3–12)
MYOGLOBIN SERPL-MCNC: 3602 NG/ML (ref 28–72)
NEUTROPHILS NFR BLD: 77 % (ref 36–65)
NEUTS SEG NFR BLD: 9.91 K/UL (ref 1.5–8.1)
NITRITE UR QL STRIP: NEGATIVE
NRBC BLD-RTO: 0 PER 100 WBC
O2 DELIVERY DEVICE: ABNORMAL
O2 SAT, VEN: 21.3 % (ref 60–85)
OSMOLALITY SERPL: 274 MOSM/KG (ref 275–295)
OSMOLALITY UR: 137 MOSM/KG (ref 80–1300)
PARTIAL THROMBOPLASTIN TIME: 29.6 SEC (ref 23–36.5)
PATIENT TEMP: 37
PCO2 VENOUS: 43.3 MM HG (ref 41–51)
PH UR STRIP: 5.5 [PH] (ref 5–8)
PH VENOUS: 7.41 (ref 7.32–7.43)
PHOSPHATE SERPL-MCNC: 4.6 MG/DL (ref 2.5–4.5)
PLATELET # BLD AUTO: 290 K/UL (ref 138–453)
PMV BLD AUTO: 10.8 FL (ref 8.1–13.5)
PO2 VENOUS: 15.8 MM HG (ref 30–50)
POC ANION GAP: 22 MMOL/L (ref 7–16)
POC CREATININE: 3.9 MG/DL (ref 0.51–1.19)
POC HCO3: 32.6 MMOL/L (ref 21–28)
POC HEMOGLOBIN (CALC): 14.5 G/DL (ref 13.5–17.5)
POC LACTIC ACID: 1.3 MMOL/L (ref 0.56–1.39)
POC LACTIC ACID: 11.8 MMOL/L (ref 0.56–1.39)
POC O2 SATURATION: 94.7 % (ref 94–98)
POC PCO2: 43.7 MM HG (ref 35–48)
POC PH: 7.48 (ref 7.35–7.45)
POC PO2: 69.2 MM HG (ref 83–108)
POSITIVE BASE EXCESS, ART: 8.1 MMOL/L (ref 0–3)
POSITIVE BASE EXCESS, VEN: 2.1 MMOL/L (ref 0–3)
POTASSIUM BLD-SCNC: <1.5 MMOL/L (ref 3.5–4.5)
POTASSIUM SERPL-SCNC: 1.9 MMOL/L (ref 3.7–5.3)
POTASSIUM SERPL-SCNC: 2.8 MMOL/L (ref 3.7–5.3)
POTASSIUM, UR: 13.2 MMOL/L
PROT SERPL-MCNC: 6.8 G/DL (ref 6.6–8.7)
PROT UR STRIP-MCNC: ABNORMAL MG/DL
PROTHROMBIN TIME: 16.2 SEC (ref 11.7–14.9)
RBC # BLD AUTO: 4.45 M/UL (ref 4.21–5.77)
RBC #/AREA URNS HPF: ABNORMAL /HPF (ref 0–4)
SAMPLE SITE: ABNORMAL
SARS-COV-2 RNA RESP QL NAA+PROBE: NOT DETECTED
SODIUM BLD-SCNC: 121 MMOL/L (ref 138–146)
SODIUM SERPL-SCNC: 125 MMOL/L (ref 136–145)
SODIUM SERPL-SCNC: 130 MMOL/L (ref 136–145)
SODIUM UR-SCNC: <20 MMOL/L
SOURCE: NORMAL
SP GR UR STRIP: 1.01 (ref 1–1.03)
SPECIMEN DESCRIPTION: NORMAL
TOTAL PROTEIN, URINE: 33 MG/DL
TROPONIN I SERPL HS-MCNC: 23 NG/L (ref 0–22)
TROPONIN I SERPL HS-MCNC: 29 NG/L (ref 0–22)
TSH SERPL DL<=0.05 MIU/L-ACNC: 1.73 UIU/ML (ref 0.27–4.2)
UROBILINOGEN UR STRIP-ACNC: NORMAL EU/DL (ref 0–1)
WBC #/AREA URNS HPF: ABNORMAL /HPF (ref 0–5)
WBC OTHER # BLD: 12.9 K/UL (ref 3.5–11.3)

## 2025-04-17 PROCEDURE — 71045 X-RAY EXAM CHEST 1 VIEW: CPT

## 2025-04-17 PROCEDURE — 83874 ASSAY OF MYOGLOBIN: CPT

## 2025-04-17 PROCEDURE — 6370000000 HC RX 637 (ALT 250 FOR IP)

## 2025-04-17 PROCEDURE — 6360000002 HC RX W HCPCS

## 2025-04-17 PROCEDURE — 86160 COMPLEMENT ANTIGEN: CPT

## 2025-04-17 PROCEDURE — 2500000003 HC RX 250 WO HCPCS

## 2025-04-17 PROCEDURE — 85610 PROTHROMBIN TIME: CPT

## 2025-04-17 PROCEDURE — 85025 COMPLETE CBC W/AUTO DIFF WBC: CPT

## 2025-04-17 PROCEDURE — 85730 THROMBOPLASTIN TIME PARTIAL: CPT

## 2025-04-17 PROCEDURE — 99285 EMERGENCY DEPT VISIT HI MDM: CPT

## 2025-04-17 PROCEDURE — 84133 ASSAY OF URINE POTASSIUM: CPT

## 2025-04-17 PROCEDURE — 02HV33Z INSERTION OF INFUSION DEVICE INTO SUPERIOR VENA CAVA, PERCUTANEOUS APPROACH: ICD-10-PCS | Performed by: EMERGENCY MEDICINE

## 2025-04-17 PROCEDURE — 84156 ASSAY OF PROTEIN URINE: CPT

## 2025-04-17 PROCEDURE — 86038 ANTINUCLEAR ANTIBODIES: CPT

## 2025-04-17 PROCEDURE — 96375 TX/PRO/DX INJ NEW DRUG ADDON: CPT

## 2025-04-17 PROCEDURE — 87040 BLOOD CULTURE FOR BACTERIA: CPT

## 2025-04-17 PROCEDURE — 2000000000 HC ICU R&B

## 2025-04-17 PROCEDURE — 80307 DRUG TEST PRSMV CHEM ANLYZR: CPT

## 2025-04-17 PROCEDURE — 94761 N-INVAS EAR/PLS OXIMETRY MLT: CPT

## 2025-04-17 PROCEDURE — 80053 COMPREHEN METABOLIC PANEL: CPT

## 2025-04-17 PROCEDURE — 93005 ELECTROCARDIOGRAM TRACING: CPT | Performed by: STUDENT IN AN ORGANIZED HEALTH CARE EDUCATION/TRAINING PROGRAM

## 2025-04-17 PROCEDURE — 83605 ASSAY OF LACTIC ACID: CPT

## 2025-04-17 PROCEDURE — 82533 TOTAL CORTISOL: CPT

## 2025-04-17 PROCEDURE — 83935 ASSAY OF URINE OSMOLALITY: CPT

## 2025-04-17 PROCEDURE — 84520 ASSAY OF UREA NITROGEN: CPT

## 2025-04-17 PROCEDURE — 84443 ASSAY THYROID STIM HORMONE: CPT

## 2025-04-17 PROCEDURE — 2580000003 HC RX 258

## 2025-04-17 PROCEDURE — 82803 BLOOD GASES ANY COMBINATION: CPT

## 2025-04-17 PROCEDURE — 36415 COLL VENOUS BLD VENIPUNCTURE: CPT

## 2025-04-17 PROCEDURE — 82565 ASSAY OF CREATININE: CPT

## 2025-04-17 PROCEDURE — 80051 ELECTROLYTE PANEL: CPT

## 2025-04-17 PROCEDURE — 96365 THER/PROPH/DIAG IV INF INIT: CPT

## 2025-04-17 PROCEDURE — 80048 BASIC METABOLIC PNL TOTAL CA: CPT

## 2025-04-17 PROCEDURE — 81001 URINALYSIS AUTO W/SCOPE: CPT

## 2025-04-17 PROCEDURE — 82550 ASSAY OF CK (CPK): CPT

## 2025-04-17 PROCEDURE — 70450 CT HEAD/BRAIN W/O DYE: CPT

## 2025-04-17 PROCEDURE — 82570 ASSAY OF URINE CREATININE: CPT

## 2025-04-17 PROCEDURE — 82010 KETONE BODYS QUAN: CPT

## 2025-04-17 PROCEDURE — 74176 CT ABD & PELVIS W/O CONTRAST: CPT

## 2025-04-17 PROCEDURE — 83930 ASSAY OF BLOOD OSMOLALITY: CPT

## 2025-04-17 PROCEDURE — 80074 ACUTE HEPATITIS PANEL: CPT

## 2025-04-17 PROCEDURE — 3E033XZ INTRODUCTION OF VASOPRESSOR INTO PERIPHERAL VEIN, PERCUTANEOUS APPROACH: ICD-10-PCS

## 2025-04-17 PROCEDURE — 82330 ASSAY OF CALCIUM: CPT

## 2025-04-17 PROCEDURE — 82947 ASSAY GLUCOSE BLOOD QUANT: CPT

## 2025-04-17 PROCEDURE — 84155 ASSAY OF PROTEIN SERUM: CPT

## 2025-04-17 PROCEDURE — 36600 WITHDRAWAL OF ARTERIAL BLOOD: CPT

## 2025-04-17 PROCEDURE — 87086 URINE CULTURE/COLONY COUNT: CPT

## 2025-04-17 PROCEDURE — 86334 IMMUNOFIX E-PHORESIS SERUM: CPT

## 2025-04-17 PROCEDURE — 84100 ASSAY OF PHOSPHORUS: CPT

## 2025-04-17 PROCEDURE — 2580000003 HC RX 258: Performed by: INTERNAL MEDICINE

## 2025-04-17 PROCEDURE — 83735 ASSAY OF MAGNESIUM: CPT

## 2025-04-17 PROCEDURE — 72125 CT NECK SPINE W/O DYE: CPT

## 2025-04-17 PROCEDURE — 84300 ASSAY OF URINE SODIUM: CPT

## 2025-04-17 PROCEDURE — 84484 ASSAY OF TROPONIN QUANT: CPT

## 2025-04-17 PROCEDURE — 87636 SARSCOV2 & INF A&B AMP PRB: CPT

## 2025-04-17 PROCEDURE — 86225 DNA ANTIBODY NATIVE: CPT

## 2025-04-17 PROCEDURE — 83521 IG LIGHT CHAINS FREE EACH: CPT

## 2025-04-17 PROCEDURE — 83516 IMMUNOASSAY NONANTIBODY: CPT

## 2025-04-17 PROCEDURE — 84165 PROTEIN E-PHORESIS SERUM: CPT

## 2025-04-17 PROCEDURE — 85014 HEMATOCRIT: CPT

## 2025-04-17 PROCEDURE — 99291 CRITICAL CARE FIRST HOUR: CPT | Performed by: STUDENT IN AN ORGANIZED HEALTH CARE EDUCATION/TRAINING PROGRAM

## 2025-04-17 RX ORDER — POTASSIUM CHLORIDE 29.8 MG/ML
20 INJECTION INTRAVENOUS
Status: DISPENSED | OUTPATIENT
Start: 2025-04-17 | End: 2025-04-17

## 2025-04-17 RX ORDER — ACETAMINOPHEN 650 MG/1
650 SUPPOSITORY RECTAL EVERY 6 HOURS PRN
Status: DISCONTINUED | OUTPATIENT
Start: 2025-04-17 | End: 2025-04-23 | Stop reason: HOSPADM

## 2025-04-17 RX ORDER — SODIUM CHLORIDE 450 MG/100ML
INJECTION, SOLUTION INTRAVENOUS CONTINUOUS
Status: DISCONTINUED | OUTPATIENT
Start: 2025-04-17 | End: 2025-04-18

## 2025-04-17 RX ORDER — SODIUM CHLORIDE 9 MG/ML
INJECTION, SOLUTION INTRAVENOUS PRN
Status: DISCONTINUED | OUTPATIENT
Start: 2025-04-17 | End: 2025-04-23 | Stop reason: HOSPADM

## 2025-04-17 RX ORDER — POTASSIUM CHLORIDE 29.8 MG/ML
20 INJECTION INTRAVENOUS ONCE
Status: COMPLETED | OUTPATIENT
Start: 2025-04-17 | End: 2025-04-18

## 2025-04-17 RX ORDER — SODIUM CHLORIDE, SODIUM LACTATE, POTASSIUM CHLORIDE, AND CALCIUM CHLORIDE .6; .31; .03; .02 G/100ML; G/100ML; G/100ML; G/100ML
1000 INJECTION, SOLUTION INTRAVENOUS ONCE
Status: COMPLETED | OUTPATIENT
Start: 2025-04-17 | End: 2025-04-17

## 2025-04-17 RX ORDER — POLYETHYLENE GLYCOL 3350 17 G/17G
17 POWDER, FOR SOLUTION ORAL DAILY PRN
Status: DISCONTINUED | OUTPATIENT
Start: 2025-04-17 | End: 2025-04-23 | Stop reason: HOSPADM

## 2025-04-17 RX ORDER — ACETAMINOPHEN 325 MG/1
650 TABLET ORAL EVERY 6 HOURS PRN
Status: DISCONTINUED | OUTPATIENT
Start: 2025-04-17 | End: 2025-04-23 | Stop reason: HOSPADM

## 2025-04-17 RX ORDER — POTASSIUM CHLORIDE 1500 MG/1
40 TABLET, EXTENDED RELEASE ORAL ONCE
Status: DISCONTINUED | OUTPATIENT
Start: 2025-04-17 | End: 2025-04-17

## 2025-04-17 RX ORDER — ONDANSETRON 2 MG/ML
4 INJECTION INTRAMUSCULAR; INTRAVENOUS EVERY 6 HOURS PRN
Status: DISCONTINUED | OUTPATIENT
Start: 2025-04-17 | End: 2025-04-23 | Stop reason: HOSPADM

## 2025-04-17 RX ORDER — HYDROCORTISONE SODIUM SUCCINATE 100 MG/2ML
50 INJECTION INTRAMUSCULAR; INTRAVENOUS EVERY 6 HOURS
Status: DISCONTINUED | OUTPATIENT
Start: 2025-04-17 | End: 2025-04-18

## 2025-04-17 RX ORDER — HEPARIN SODIUM 5000 [USP'U]/ML
5000 INJECTION, SOLUTION INTRAVENOUS; SUBCUTANEOUS EVERY 8 HOURS SCHEDULED
Status: DISCONTINUED | OUTPATIENT
Start: 2025-04-17 | End: 2025-04-18

## 2025-04-17 RX ORDER — SODIUM CHLORIDE, SODIUM LACTATE, POTASSIUM CHLORIDE, CALCIUM CHLORIDE 600; 310; 30; 20 MG/100ML; MG/100ML; MG/100ML; MG/100ML
INJECTION, SOLUTION INTRAVENOUS CONTINUOUS
Status: DISCONTINUED | OUTPATIENT
Start: 2025-04-17 | End: 2025-04-17

## 2025-04-17 RX ORDER — 0.9 % SODIUM CHLORIDE 0.9 %
1000 INTRAVENOUS SOLUTION INTRAVENOUS ONCE
Status: COMPLETED | OUTPATIENT
Start: 2025-04-17 | End: 2025-04-17

## 2025-04-17 RX ORDER — ONDANSETRON 2 MG/ML
4 INJECTION INTRAMUSCULAR; INTRAVENOUS ONCE
Status: COMPLETED | OUTPATIENT
Start: 2025-04-17 | End: 2025-04-17

## 2025-04-17 RX ORDER — LISINOPRIL 40 MG/1
1 TABLET ORAL EVERY MORNING
Status: ON HOLD | COMMUNITY
Start: 2024-08-30 | End: 2025-04-22 | Stop reason: HOSPADM

## 2025-04-17 RX ORDER — SODIUM CHLORIDE 0.9 % (FLUSH) 0.9 %
5-40 SYRINGE (ML) INJECTION PRN
Status: DISCONTINUED | OUTPATIENT
Start: 2025-04-17 | End: 2025-04-23 | Stop reason: HOSPADM

## 2025-04-17 RX ORDER — ONDANSETRON 4 MG/1
4 TABLET, ORALLY DISINTEGRATING ORAL EVERY 8 HOURS PRN
Status: DISCONTINUED | OUTPATIENT
Start: 2025-04-17 | End: 2025-04-23 | Stop reason: HOSPADM

## 2025-04-17 RX ORDER — MAGNESIUM SULFATE IN WATER 40 MG/ML
2000 INJECTION, SOLUTION INTRAVENOUS ONCE
Status: DISCONTINUED | OUTPATIENT
Start: 2025-04-17 | End: 2025-04-17

## 2025-04-17 RX ORDER — NOREPINEPHRINE BITARTRATE 0.06 MG/ML
1-100 INJECTION, SOLUTION INTRAVENOUS CONTINUOUS
Status: DISCONTINUED | OUTPATIENT
Start: 2025-04-17 | End: 2025-04-18

## 2025-04-17 RX ORDER — POTASSIUM CHLORIDE 7.45 MG/ML
10 INJECTION INTRAVENOUS ONCE
Status: COMPLETED | OUTPATIENT
Start: 2025-04-17 | End: 2025-04-19

## 2025-04-17 RX ORDER — LANOLIN ALCOHOL/MO/W.PET/CERES
100 CREAM (GRAM) TOPICAL DAILY
Status: DISCONTINUED | OUTPATIENT
Start: 2025-04-17 | End: 2025-04-23 | Stop reason: HOSPADM

## 2025-04-17 RX ORDER — SODIUM CHLORIDE 9 MG/ML
INJECTION, SOLUTION INTRAVENOUS PRN
Status: DISCONTINUED | OUTPATIENT
Start: 2025-04-17 | End: 2025-04-17

## 2025-04-17 RX ORDER — POTASSIUM CHLORIDE 1500 MG/1
20 TABLET, EXTENDED RELEASE ORAL ONCE
Status: COMPLETED | OUTPATIENT
Start: 2025-04-17 | End: 2025-04-17

## 2025-04-17 RX ORDER — FENTANYL CITRATE 50 UG/ML
50 INJECTION, SOLUTION INTRAMUSCULAR; INTRAVENOUS ONCE
Status: DISCONTINUED | OUTPATIENT
Start: 2025-04-17 | End: 2025-04-21

## 2025-04-17 RX ORDER — SODIUM CHLORIDE 0.9 % (FLUSH) 0.9 %
5-40 SYRINGE (ML) INJECTION EVERY 12 HOURS SCHEDULED
Status: DISCONTINUED | OUTPATIENT
Start: 2025-04-17 | End: 2025-04-23 | Stop reason: HOSPADM

## 2025-04-17 RX ORDER — FOLIC ACID 1 MG/1
1 TABLET ORAL DAILY
Status: DISCONTINUED | OUTPATIENT
Start: 2025-04-17 | End: 2025-04-23 | Stop reason: HOSPADM

## 2025-04-17 RX ORDER — MAGNESIUM SULFATE IN WATER 40 MG/ML
2000 INJECTION, SOLUTION INTRAVENOUS ONCE
Status: COMPLETED | OUTPATIENT
Start: 2025-04-17 | End: 2025-04-17

## 2025-04-17 RX ORDER — POTASSIUM CHLORIDE 29.8 MG/ML
20 INJECTION INTRAVENOUS
Status: DISCONTINUED | OUTPATIENT
Start: 2025-04-17 | End: 2025-04-17

## 2025-04-17 RX ORDER — POTASSIUM CHLORIDE 29.8 MG/ML
20 INJECTION INTRAVENOUS ONCE
Status: COMPLETED | OUTPATIENT
Start: 2025-04-17 | End: 2025-04-17

## 2025-04-17 RX ADMIN — SODIUM CHLORIDE 1000 ML: 9 INJECTION, SOLUTION INTRAVENOUS at 06:31

## 2025-04-17 RX ADMIN — SODIUM CHLORIDE 1000 ML: 9 INJECTION, SOLUTION INTRAVENOUS at 06:25

## 2025-04-17 RX ADMIN — ACETAMINOPHEN 650 MG: 325 TABLET ORAL at 23:09

## 2025-04-17 RX ADMIN — ONDANSETRON 4 MG: 2 INJECTION, SOLUTION INTRAMUSCULAR; INTRAVENOUS at 06:41

## 2025-04-17 RX ADMIN — SODIUM CHLORIDE, PRESERVATIVE FREE 10 ML: 5 INJECTION INTRAVENOUS at 20:06

## 2025-04-17 RX ADMIN — FOLIC ACID 1 MG: 1 TABLET ORAL at 14:19

## 2025-04-17 RX ADMIN — POTASSIUM CHLORIDE 20 MEQ: 29.8 INJECTION, SOLUTION INTRAVENOUS at 10:29

## 2025-04-17 RX ADMIN — POTASSIUM CHLORIDE 20 MEQ: 29.8 INJECTION, SOLUTION INTRAVENOUS at 11:53

## 2025-04-17 RX ADMIN — HYDROCORTISONE SODIUM SUCCINATE 50 MG: 100 INJECTION, POWDER, FOR SOLUTION INTRAMUSCULAR; INTRAVENOUS at 17:57

## 2025-04-17 RX ADMIN — SODIUM CHLORIDE, SODIUM LACTATE, POTASSIUM CHLORIDE, AND CALCIUM CHLORIDE: .6; .31; .03; .02 INJECTION, SOLUTION INTRAVENOUS at 13:56

## 2025-04-17 RX ADMIN — Medication 100 MG: at 14:21

## 2025-04-17 RX ADMIN — HYDROCORTISONE SODIUM SUCCINATE 50 MG: 100 INJECTION, POWDER, FOR SOLUTION INTRAMUSCULAR; INTRAVENOUS at 21:59

## 2025-04-17 RX ADMIN — SODIUM CHLORIDE: 0.45 INJECTION, SOLUTION INTRAVENOUS at 22:15

## 2025-04-17 RX ADMIN — POTASSIUM CHLORIDE 20 MEQ: 29.8 INJECTION, SOLUTION INTRAVENOUS at 13:00

## 2025-04-17 RX ADMIN — POTASSIUM BICARBONATE 40 MEQ: 782 TABLET, EFFERVESCENT ORAL at 21:58

## 2025-04-17 RX ADMIN — Medication 5 MCG/MIN: at 06:55

## 2025-04-17 RX ADMIN — POTASSIUM CHLORIDE 20 MEQ: 1500 TABLET, EXTENDED RELEASE ORAL at 20:04

## 2025-04-17 RX ADMIN — SODIUM CHLORIDE: 0.9 INJECTION, SOLUTION INTRAVENOUS at 10:27

## 2025-04-17 RX ADMIN — Medication 1750 MG: at 09:06

## 2025-04-17 RX ADMIN — MAGNESIUM SULFATE HEPTAHYDRATE 2000 MG: 40 INJECTION, SOLUTION INTRAVENOUS at 21:58

## 2025-04-17 RX ADMIN — POTASSIUM CHLORIDE 20 MEQ: 29.8 INJECTION, SOLUTION INTRAVENOUS at 19:00

## 2025-04-17 RX ADMIN — POTASSIUM CHLORIDE 20 MEQ: 29.8 INJECTION, SOLUTION INTRAVENOUS at 16:54

## 2025-04-17 RX ADMIN — WATER 1000 MG: 1 INJECTION INTRAMUSCULAR; INTRAVENOUS; SUBCUTANEOUS at 23:58

## 2025-04-17 RX ADMIN — HYDROCORTISONE SODIUM SUCCINATE 50 MG: 100 INJECTION, POWDER, FOR SOLUTION INTRAMUSCULAR; INTRAVENOUS at 14:19

## 2025-04-17 RX ADMIN — HEPARIN SODIUM 5000 UNITS: 5000 INJECTION INTRAVENOUS; SUBCUTANEOUS at 14:19

## 2025-04-17 RX ADMIN — HEPARIN SODIUM 5000 UNITS: 5000 INJECTION INTRAVENOUS; SUBCUTANEOUS at 22:06

## 2025-04-17 RX ADMIN — PIPERACILLIN AND TAZOBACTAM 4500 MG: 4; .5 INJECTION, POWDER, LYOPHILIZED, FOR SOLUTION INTRAVENOUS; PARENTERAL at 07:19

## 2025-04-17 RX ADMIN — SODIUM CHLORIDE, SODIUM LACTATE, POTASSIUM CHLORIDE, AND CALCIUM CHLORIDE 1000 ML: .6; .31; .03; .02 INJECTION, SOLUTION INTRAVENOUS at 11:32

## 2025-04-17 RX ADMIN — POTASSIUM BICARBONATE 40 MEQ: 782 TABLET, EFFERVESCENT ORAL at 11:28

## 2025-04-17 RX ADMIN — POTASSIUM CHLORIDE 20 MEQ: 29.8 INJECTION, SOLUTION INTRAVENOUS at 23:17

## 2025-04-17 ASSESSMENT — PAIN SCALES - WONG BAKER: WONGBAKER_NUMERICALRESPONSE: HURTS A LITTLE BIT

## 2025-04-17 ASSESSMENT — PAIN SCALES - GENERAL
PAINLEVEL_OUTOF10: 10
PAINLEVEL_OUTOF10: 6

## 2025-04-17 ASSESSMENT — LIFESTYLE VARIABLES
HOW OFTEN DO YOU HAVE A DRINK CONTAINING ALCOHOL: PATIENT DECLINED
HOW MANY STANDARD DRINKS CONTAINING ALCOHOL DO YOU HAVE ON A TYPICAL DAY: PATIENT DECLINED

## 2025-04-17 ASSESSMENT — PAIN - FUNCTIONAL ASSESSMENT: PAIN_FUNCTIONAL_ASSESSMENT: 0-10

## 2025-04-17 ASSESSMENT — PAIN DESCRIPTION - LOCATION: LOCATION: CHEST

## 2025-04-17 NOTE — ED PROVIDER NOTES
[facility]  FACULTY HANDOFF       Handoff taken on the following patient from prior Attending Physician:  Pt Name: Rasheed Bryant  PCP:  Paul Mujica, ADEOLA - CNP    Attestation  I was available and discussed any additional care issues that arose and coordinated the management plans with all resident(s) caring for the patient during my duty period. Any areas of disagreement with resident's documentation of care or procedures are noted on the chart. I was personally present for the key portions of any/all procedures during my duty period. I have documented in the chart those procedures where I was not present during the key portions.          Steven Lorenzo MD  04/17/25 [id number]

## 2025-04-17 NOTE — CONSULTS
NEPHROLOGY     REASON FOR CONSULT:     BAR (BUN/Creat 21/4          with baseline creatinine 1.2 in 2022              )  Hypokalemia  Hyponatremia      Risk Factors for BAR:  Poor p.o./low blood pressure    ASSESSMENT     Acute kidney injury nonoliguric secondary to ischemic ATN from volume depletion (weight loss/poor p.o.) evolving.  Rule out chronic inflammatory condition/vasculitis/malignancy  Hypovolemic hyponatremia secondary to appropriate ADH excess from volume depletion  Hypokalemia likely nutritional deficiency rule out renal wasting/tubulopathy  High acidosis secondary to acute kidney injury  Acute hepatitis  History of A-fib on anticoagulation     PLAN     Replace potassium aggressively.    Recheck magnesium and replace appropriately.  Labs as ordered      HISTORY OF PRESENTING ILLNESS                 This is a 59 y.o. male who presents with ***.    Urine output  No history of contrast exposure  No history of hypotension  No history of NSAID/ACE/ARB/nephrotoxic agents  No history suggestive of obstruction  No history of nausea/vomiting/diarrhoea  No history of BAR in past  No history of recurrent UTI infection/surgeries to KUB    In retrospect,the individual has CKD         Secondary to        With baseline creatinine between                     Under the care of                      . All serologies in past negative.      PAST MEDICAL HISTORY         Diagnosis Date    CHF (congestive heart failure) (HCC)     Hyperlipidemia     Hypertension        PAST SURGICAL HISTORY      History reviewed. No pertinent surgical history.    MEDICATIONS     Home Meds:                Medications Prior to Admission: lisinopril (PRINIVIL;ZESTRIL) 40 MG tablet, Take 1 tablet by mouth every morning  metoprolol tartrate (LOPRESSOR) 50 MG tablet, Take 1 tablet by mouth 2 times daily  vitamin D (ERGOCALCIFEROL) 1.25 MG (20041 UT) CAPS capsule, Take 1 capsule by mouth once a week  apixaban (ELIQUIS) 5 MG TABS tablet, Take 1 tablet

## 2025-04-17 NOTE — ED TRIAGE NOTES
Pt presents to ED from home c/o dizziness, chest pain, SOB, LOC, increased falling over the past 2 weeks, due to \"not being able to get to his walker\". Pt was given 324 ASA and 1 Nitro by Medic 18 for his chest pain. Pt takes Eliquis at home, states he \"falls all the time and hits his head\" and that's normal for him. Pt denies n/v/d,  fever, chills, change in bowel/bladder function, loss of appetite, or any other sx.     Pt is A&OX4, placed on full monitor, EKG done, IV established, changed into gown and given warm blankets. Labs drawn, labeled, and sent to lab. Pt vitals show WNL. Pt is uncooperative, malodorous, covered in stool. Pt is cleaned up, new bedding and new chux/new brief applied and is placed on external catheter. Pt is a poor historian and responds yes to every question asked and does not know what meds he takes at home daily. Pt has multiple excoriated areas on his buttocks, feet, and appears to be unable to care for self at home. White board updated, and patient is updated on plan of care.

## 2025-04-17 NOTE — ED PROVIDER NOTES
[facility] EMERGENCY DEPARTMENT  Emergency Department Encounter  Emergency Medicine Resident     Pt Name:Rasheed Bryant  MRN: [id number]  Birthdate 1965  Date of evaluation: 4/17/25  PCP:  Paul Mujica APRN - CNP  Note Started: 4:53 AM EDT      CHIEF COMPLAINT       Chief Complaint   Patient presents with    Chest Pain    Dizziness       HISTORY OF PRESENT ILLNESS  (Location/Symptom, Timing/Onset, Context/Setting, Quality, Duration, Modifying Factors, Severity.)      Rasheed Bryant is a 59 y.o. male presenting to ED for    CC's: Chest pain, dizziness    Patient very poor historian.  Patient brought in via EMS with EMS endorsing that patient has chest pain and dizziness.  Patient was given nitro and aspirin.  Patient brought in covered in fecal matter and urine.  Additional collateral information obtained via patient's mother said patient was found on the ground unable to be assisted up.  Patient felt like \"I am going to die \"and his mother subsequently gave him a bunch of nitro.  Patient was on the ground.  Patient Dors is that he did fall and hit his head.  Patient is on Eliquis.    Patient's last TTE EF 50 to 55%, mild to moderate regurg performed October 2024    Notable PMHx/PSXH: CKD, DVT, hypertension, peripheral arterial disease, A-fib with RVR, syncope, CHF, hyperlipidemia, hypertension    Notable Home Meds: Metoprolol, vitamin D, Eliquis, Crestor, trazodone    PAST MEDICAL / SURGICAL / SOCIAL / FAMILY HISTORY     Problem List: has Oth fracture of shaft of radius, left arm, init for clos fx; Head trauma, initial encounter; Chronic kidney disease; DVT (deep venous thrombosis) (HCC); H/O fasciotomy; Hypertension; PAD (peripheral artery disease); Arthritis; Anticoagulated; Chronic atrial fibrillation (HCC); Nasal fracture; Syncope and collapse; Dizziness; Atrial fibrillation with rapid ventricular response (HCC); Syncope; Atrial fibrillation with RVR (HCC); and Paroxysmal atrial fibrillation (HCC)

## 2025-04-17 NOTE — ED NOTES
The following labs were labeled with appropriate pt sticker and tubed to lab:     [x] Blue     [x] Lavender   [] on ice  [x] Green/yellow  [x] Green/black [] on ice  [] Matthew  [] on ice  [x] Yellow  [] Red  [] Pink  [] Type/ Screen  [] ABG  [] VBG    [] COVID-19 swab    [] Rapid  [] PCR  [] Flu swab  [] Peds Viral Panel     [] Urine Sample  [] Fecal Sample  [] Pelvic Cultures  [] Blood Cultures  [] X 2  [] STREP Cultures  [] Wound Cultures

## 2025-04-17 NOTE — CARE COORDINATION
Case Management Assessment  Initial Evaluation    Date/Time of Evaluation: 4/17/2025 2:58 PM  Assessment Completed by: Virgen Sheridan    If patient is discharged prior to next notation, then this note serves as note for discharge by case management.    Patient Name: Rasheed Bryant                   YOB: 1965  Diagnosis: Dizziness [R42]  Anticoagulated [Z79.01]  BAR (acute kidney injury) [N17.9]  Acute renal failure, unspecified acute renal failure type [N17.9]  Chest pain, unspecified type [R07.9]                   Date / Time: 4/17/2025  4:45 AM    Patient Admission Status: Inpatient   Readmission Risk (Low < 19, Mod (19-27), High > 27): Readmission Risk Score: 12.7    Current PCP: Paul Mujica APRN - CNP  PCP verified by CM? (P) Yes    Chart Reviewed: Yes      History Provided by: (P) Patient  Patient Orientation: (P) Alert and Oriented    Patient Cognition: (P) Alert    Hospitalization in the last 30 days (Readmission):  No    If yes, Readmission Assessment in  Navigator will be completed.    Advance Directives:      Code Status: Full Code   Patient's Primary Decision Maker is: (P) Legal Next of Kin      Discharge Planning:    Patient lives with: (P) Family Members Type of Home: (P) House  Primary Care Giver: (P) Self  Patient Support Systems include:     Current Financial resources: (P) Medicaid  Current community resources: (P) None  Current services prior to admission: (P) Durable Medical Equipment            Current DME: (P) Walker, Cane            Type of Home Care services:  (P) None    ADLS  Prior functional level: (P) Assistance with the following:, Mobility  Current functional level: (P) Assistance with the following:, Mobility    PT AM-PAC:   /24  OT AM-PAC:   /24    Family can provide assistance at DC: (P) No  Would you like Case Management to discuss the discharge plan with any other family members/significant others, and if so, who? (P) No  Plans to Return to Present Housing:

## 2025-04-17 NOTE — ED PROVIDER NOTES
[facility]     Emergency Department     Faculty Attestation  5:42 AM EDT      I performed a history and physical examination of the patient and discussed management with the resident. I have reviewed and agree with the resident’s findings including all diagnostic interpretations, and treatment plans as written. Any areas of disagreement are noted on the chart. I was personally present for the key portions of any procedures. I have documented in the chart those procedures where I was not present during the key portions. I have reviewed the emergency nurses triage note. I agree with the chief complaint, past medical history, past surgical history, allergies, medications, social and family history as documented unless otherwise noted below. Documentation of the HPI, Physical Exam and Medical Decision Making performed by scribsushant is based on my personal performance of the HPI, PE and MDM. For Physician Assistant/ Nurse Practitioner cases/documentation I have personally evaluated this patient and have completed at least one if not all key elements of the E/M (history, physical exam, and MDM). Additional findings are as noted.    Patient brought in by EMS, reportedly took a fall tonight, unable to get up despite help by mom and laid on the ground.  Patient is anticoagulated.  Also reports having some midsternal nonradiating chest pain and feeling slightly short of breath.  He does have a history of A-fib and is anticoagulated because of that.  Patient does have some bruising bilateral lower feet, no active bleeding noted his abdomen is protuberant, with a ventral hernia noted that is soft and easily reducible but patient does have tenderness diffusely on his abdomen.  He is speaking in full sentences.   Patient will awake he obeys commands extraocular movements are intact.    EKG shows atrial fibrillation poor R wave progression is noted, left bundle branch block

## 2025-04-17 NOTE — ED PROVIDER NOTES
[facility] EMERGENCY DEPARTMENT  Emergency Department  Emergency Medicine Resident Turn-Over     Note Started: 6:02 AM EDT    Care of Rasheed Bryant was assumed from Dr. Bustamante and is being seen for Chest Pain and Dizziness  .  The patient's initial evaluation and plan have been discussed with the prior provider who initially evaluated the patient.     EMERGENCY DEPARTMENT COURSE / MEDICAL DECISION MAKING:       MEDICATIONS GIVEN:  Orders Placed This Encounter   Medications    fentaNYL (SUBLIMAZE) injection 50 mcg    sodium chloride 0.9 % bolus 1,000 mL    ondansetron (ZOFRAN) injection 4 mg    sodium chloride 0.9 % bolus 1,000 mL    norepinephrine (LEVOPHED) 16 mg in sodium chloride 0.9 % 250 mL infusion (premix)     Titrate Infusion?:   Yes     Initial Infusion Dose::   5 mcg/min     Goal of Therapy is::   MAP greater than 65 mmHg     Contact Provider if::   Patient is receiving the maximum dose and is not achieving the goal of therapy    vancomycin (VANCOCIN) 1750 mg in sodium chloride 0.9 % 500 mL IVPB     Antimicrobial Indications:   Sepsis of Unknown Etiology     Sepsis duration of therapy:   7 days    piperacillin-tazobactam (ZOSYN) 4,500 mg in sodium chloride 0.9 % 100 mL IVPB (addEASE)     Antimicrobial Indications:   Sepsis of Unknown Etiology     Sepsis duration of therapy:   7 days    potassium chloride 20 mEq/50 mL IVPB (Central Line)    sodium chloride flush 0.9 % injection 5-40 mL    sodium chloride flush 0.9 % injection 5-40 mL    0.9 % sodium chloride infusion    OR Linked Order Group     ondansetron (ZOFRAN-ODT) disintegrating tablet 4 mg     ondansetron (ZOFRAN) injection 4 mg    polyethylene glycol (GLYCOLAX) packet 17 g    OR Linked Order Group     acetaminophen (TYLENOL) tablet 650 mg     acetaminophen (TYLENOL) suppository 650 mg       LABS / RADIOLOGY:     Labs Reviewed   CBC WITH AUTO DIFFERENTIAL - Abnormal; Notable for the following components:       Result Value    WBC 12.9

## 2025-04-17 NOTE — H&P
Critical Care - History and Physical Examination    Patient's name:  Rasheed Bryant  Medical Record Number: [id number]  Patient's account/billing number: [id number]  Patient's YOB: 1965  Age: 59 y.o.  Date of Admission: 4/17/2025  4:45 AM  Reason of ICU admission:   Date of History and Physical Examination: 4/17/2025      Primary Care Physician: Paul Mujica APRN - CNP  Attending Physician:    Code Status: Full Code    Chief complaint: Chest pain and dizziness    Reason for ICU admission: BAR with hypokalemia and hypotension      History Of Present Illness:   History was obtained from chart review and the patient.      Rasheed Bryant is a 59 y.o. with a past medical history of CKD, DVT, hypertension, PAD, A-fib on Eliquis, CHF, hyperlipidemia and hypertension presenting to the ED with chest pain and dizziness.  Patient was brought in by EMS and encountered a possible syncopal fall the night before.  CT head, C-spine and chest abdomen pelvis were unremarkable.  Patient was found to have a WBC of 12.9, sodium of 125, potassium of 1.9, anion gap of 36, BUN of 21, creatinine of 4 and GFR 17, CK 1603 and myoglobin 3602.  Patient received 2 L normal saline bolus followed by vancomycin and Zosyn.  Patient was started on norepinephrine for ongoing hypotension.          Past Medical History:        Diagnosis Date    CHF (congestive heart failure) (HCC)     Hyperlipidemia     Hypertension        Past Surgical History:  History reviewed. No pertinent surgical history.    Allergies:    Allergies   Allergen Reactions    Wasp Venom Swelling and Angioedema         Home Medications:   Prior to Admission medications    Medication Sig Start Date End Date Taking? Authorizing Provider   lisinopril (PRINIVIL;ZESTRIL) 40 MG tablet Take 1 tablet by mouth every morning 8/30/24  Yes Provider, MD Fallon   metoprolol tartrate (LOPRESSOR) 50 MG tablet Take 1 tablet by mouth 2 times daily 10/29/24   Steve Roa DO

## 2025-04-18 ENCOUNTER — APPOINTMENT (OUTPATIENT)
Dept: ULTRASOUND IMAGING | Age: 60
DRG: 422 | End: 2025-04-18
Payer: MEDICAID

## 2025-04-18 PROBLEM — E87.20 LACTIC ACIDOSIS: Status: ACTIVE | Noted: 2025-04-18

## 2025-04-18 PROBLEM — E87.1 HYPONATREMIA: Status: ACTIVE | Noted: 2025-04-18

## 2025-04-18 LAB
ALBUMIN SERPL-MCNC: 2.9 G/DL (ref 3.5–5.2)
ALBUMIN/GLOB SERPL: 0.8 {RATIO} (ref 1–2.5)
ALP SERPL-CCNC: 63 U/L (ref 40–129)
ALT SERPL-CCNC: 46 U/L (ref 10–50)
AMPHET UR QL SCN: NEGATIVE
ANA SER QL IA: NEGATIVE
ANCA MYELOPEROXIDASE: 0.4 AU/ML (ref 0–3.5)
ANCA PROTEINASE 3: <0.7 AU/ML (ref 0–2)
ANION GAP SERPL CALCULATED.3IONS-SCNC: 14 MMOL/L (ref 9–16)
ANION GAP SERPL CALCULATED.3IONS-SCNC: 15 MMOL/L (ref 9–16)
ANION GAP SERPL CALCULATED.3IONS-SCNC: 16 MMOL/L (ref 9–16)
ANION GAP SERPL CALCULATED.3IONS-SCNC: 16 MMOL/L (ref 9–16)
AST SERPL-CCNC: 73 U/L (ref 10–50)
BARBITURATES UR QL SCN: NEGATIVE
BASOPHILS # BLD: <0.03 K/UL (ref 0–0.2)
BASOPHILS NFR BLD: 0 % (ref 0–2)
BENZODIAZ UR QL: NEGATIVE
BILIRUB DIRECT SERPL-MCNC: 0.3 MG/DL (ref 0–0.2)
BILIRUB INDIRECT SERPL-MCNC: 0.5 MG/DL (ref 0–1)
BILIRUB SERPL-MCNC: 0.8 MG/DL (ref 0–1.2)
BUN SERPL-MCNC: 11 MG/DL (ref 6–20)
BUN SERPL-MCNC: 13 MG/DL (ref 6–20)
BUN SERPL-MCNC: 14 MG/DL (ref 6–20)
BUN SERPL-MCNC: 16 MG/DL (ref 6–20)
CALCIUM SERPL-MCNC: 8.4 MG/DL (ref 8.6–10.4)
CALCIUM SERPL-MCNC: 8.6 MG/DL (ref 8.6–10.4)
CALCIUM SERPL-MCNC: 8.6 MG/DL (ref 8.6–10.4)
CALCIUM SERPL-MCNC: 8.7 MG/DL (ref 8.6–10.4)
CANNABINOIDS UR QL SCN: POSITIVE
CHLORIDE SERPL-SCNC: 88 MMOL/L (ref 98–107)
CHLORIDE SERPL-SCNC: 90 MMOL/L (ref 98–107)
CHLORIDE SERPL-SCNC: 90 MMOL/L (ref 98–107)
CHLORIDE SERPL-SCNC: 92 MMOL/L (ref 98–107)
CO2 SERPL-SCNC: 23 MMOL/L (ref 20–31)
CO2 SERPL-SCNC: 27 MMOL/L (ref 20–31)
COCAINE UR QL SCN: NEGATIVE
CREAT SERPL-MCNC: 1.2 MG/DL (ref 0.7–1.2)
CREAT SERPL-MCNC: 1.5 MG/DL (ref 0.7–1.2)
CREAT SERPL-MCNC: 1.6 MG/DL (ref 0.7–1.2)
CREAT SERPL-MCNC: 2.1 MG/DL (ref 0.7–1.2)
DSDNA IGG SER QL IA: 1.1 IU/ML
EKG ATRIAL RATE: 53 BPM
EKG ATRIAL RATE: 576 BPM
EKG Q-T INTERVAL: 392 MS
EKG Q-T INTERVAL: 404 MS
EKG QRS DURATION: 108 MS
EKG QRS DURATION: 110 MS
EKG QTC CALCULATION (BAZETT): 469 MS
EKG QTC CALCULATION (BAZETT): 495 MS
EKG R AXIS: 1 DEGREES
EKG R AXIS: 12 DEGREES
EKG T AXIS: -14 DEGREES
EKG T AXIS: -26 DEGREES
EKG VENTRICULAR RATE: 81 BPM
EKG VENTRICULAR RATE: 96 BPM
EOSINOPHIL # BLD: <0.03 K/UL (ref 0–0.44)
EOSINOPHILS RELATIVE PERCENT: 0 % (ref 1–4)
ERYTHROCYTE [DISTWIDTH] IN BLOOD BY AUTOMATED COUNT: 12.9 % (ref 11.8–14.4)
FENTANYL UR QL: NEGATIVE
GBM AB SER-ACNC: <1.9 AU/ML (ref 0–7)
GFR, ESTIMATED: 36 ML/MIN/1.73M2
GFR, ESTIMATED: 50 ML/MIN/1.73M2
GFR, ESTIMATED: 54 ML/MIN/1.73M2
GFR, ESTIMATED: 71 ML/MIN/1.73M2
GLOBULIN SER CALC-MCNC: 3.6 G/DL
GLUCOSE SERPL-MCNC: 108 MG/DL (ref 74–99)
GLUCOSE SERPL-MCNC: 108 MG/DL (ref 74–99)
GLUCOSE SERPL-MCNC: 110 MG/DL (ref 74–99)
GLUCOSE SERPL-MCNC: 112 MG/DL (ref 74–99)
HCT VFR BLD AUTO: 42.2 % (ref 40.7–50.3)
HGB BLD-MCNC: 12.9 G/DL (ref 13–17)
IMM GRANULOCYTES # BLD AUTO: 0.03 K/UL (ref 0–0.3)
IMM GRANULOCYTES NFR BLD: 1 %
LACTIC ACID, WHOLE BLOOD: 1.9 MMOL/L (ref 0.7–2.1)
LACTIC ACID, WHOLE BLOOD: 2.4 MMOL/L (ref 0.7–2.1)
LACTIC ACID, WHOLE BLOOD: 3.2 MMOL/L (ref 0.7–2.1)
LYMPHOCYTES NFR BLD: 0.57 K/UL (ref 1.1–3.7)
LYMPHOCYTES RELATIVE PERCENT: 10 % (ref 24–43)
MAGNESIUM SERPL-MCNC: 2.7 MG/DL (ref 1.6–2.6)
MAGNESIUM SERPL-MCNC: 3.2 MG/DL (ref 1.6–2.6)
MAGNESIUM SERPL-MCNC: 3.2 MG/DL (ref 1.6–2.6)
MCH RBC QN AUTO: 30.1 PG (ref 25.2–33.5)
MCHC RBC AUTO-ENTMCNC: 30.6 G/DL (ref 28.4–34.8)
MCV RBC AUTO: 98.6 FL (ref 82.6–102.9)
METHADONE UR QL: NEGATIVE
MICROORGANISM SPEC CULT: NO GROWTH
MONOCYTES NFR BLD: 0.28 K/UL (ref 0.1–1.2)
MONOCYTES NFR BLD: 5 % (ref 3–12)
MRSA, DNA, NASAL: NEGATIVE
NEUTROPHILS NFR BLD: 85 % (ref 36–65)
NEUTS SEG NFR BLD: 5 K/UL (ref 1.5–8.1)
NRBC BLD-RTO: 0 PER 100 WBC
NUCLEAR IGG SER IA-RTO: 0.3 U/ML
OPIATES UR QL SCN: NEGATIVE
OXYCODONE UR QL SCN: NEGATIVE
PCP UR QL SCN: NEGATIVE
PLATELET # BLD AUTO: 184 K/UL (ref 138–453)
PMV BLD AUTO: 10.4 FL (ref 8.1–13.5)
POTASSIUM SERPL-SCNC: 3.1 MMOL/L (ref 3.7–5.3)
POTASSIUM SERPL-SCNC: 3.1 MMOL/L (ref 3.7–5.3)
POTASSIUM SERPL-SCNC: 3.3 MMOL/L (ref 3.7–5.3)
POTASSIUM SERPL-SCNC: 3.4 MMOL/L (ref 3.7–5.3)
PROT SERPL-MCNC: 6.5 G/DL (ref 6.6–8.7)
RBC # BLD AUTO: 4.28 M/UL (ref 4.21–5.77)
SERVICE CMNT-IMP: NORMAL
SODIUM SERPL-SCNC: 128 MMOL/L (ref 136–145)
SODIUM SERPL-SCNC: 131 MMOL/L (ref 136–145)
SODIUM SERPL-SCNC: 133 MMOL/L (ref 136–145)
SODIUM SERPL-SCNC: 133 MMOL/L (ref 136–145)
SPECIMEN DESCRIPTION: NORMAL
SPECIMEN DESCRIPTION: NORMAL
TEST INFORMATION: ABNORMAL
WBC OTHER # BLD: 5.9 K/UL (ref 3.5–11.3)

## 2025-04-18 PROCEDURE — 6360000002 HC RX W HCPCS

## 2025-04-18 PROCEDURE — 2500000003 HC RX 250 WO HCPCS

## 2025-04-18 PROCEDURE — 6370000000 HC RX 637 (ALT 250 FOR IP)

## 2025-04-18 PROCEDURE — 36415 COLL VENOUS BLD VENIPUNCTURE: CPT

## 2025-04-18 PROCEDURE — 99291 CRITICAL CARE FIRST HOUR: CPT | Performed by: STUDENT IN AN ORGANIZED HEALTH CARE EDUCATION/TRAINING PROGRAM

## 2025-04-18 PROCEDURE — 85025 COMPLETE CBC W/AUTO DIFF WBC: CPT

## 2025-04-18 PROCEDURE — 87641 MR-STAPH DNA AMP PROBE: CPT

## 2025-04-18 PROCEDURE — 80076 HEPATIC FUNCTION PANEL: CPT

## 2025-04-18 PROCEDURE — 76775 US EXAM ABDO BACK WALL LIM: CPT

## 2025-04-18 PROCEDURE — 80048 BASIC METABOLIC PNL TOTAL CA: CPT

## 2025-04-18 PROCEDURE — 83735 ASSAY OF MAGNESIUM: CPT

## 2025-04-18 PROCEDURE — 2580000003 HC RX 258: Performed by: INTERNAL MEDICINE

## 2025-04-18 PROCEDURE — 83605 ASSAY OF LACTIC ACID: CPT

## 2025-04-18 PROCEDURE — 1200000000 HC SEMI PRIVATE

## 2025-04-18 PROCEDURE — 99232 SBSQ HOSP IP/OBS MODERATE 35: CPT | Performed by: INTERNAL MEDICINE

## 2025-04-18 RX ORDER — HYDROCORTISONE SODIUM SUCCINATE 100 MG/2ML
50 INJECTION INTRAMUSCULAR; INTRAVENOUS EVERY 6 HOURS
Status: COMPLETED | OUTPATIENT
Start: 2025-04-18 | End: 2025-04-18

## 2025-04-18 RX ORDER — SODIUM CHLORIDE 9 MG/ML
INJECTION, SOLUTION INTRAVENOUS CONTINUOUS
Status: DISCONTINUED | OUTPATIENT
Start: 2025-04-18 | End: 2025-04-19

## 2025-04-18 RX ORDER — HYDROCORTISONE SODIUM SUCCINATE 100 MG/2ML
25 INJECTION INTRAMUSCULAR; INTRAVENOUS EVERY 6 HOURS
Status: DISCONTINUED | OUTPATIENT
Start: 2025-04-19 | End: 2025-04-20

## 2025-04-18 RX ORDER — POTASSIUM CHLORIDE 29.8 MG/ML
20 INJECTION INTRAVENOUS PRN
Status: DISCONTINUED | OUTPATIENT
Start: 2025-04-18 | End: 2025-04-20

## 2025-04-18 RX ORDER — POTASSIUM CHLORIDE 7.45 MG/ML
10 INJECTION INTRAVENOUS PRN
Status: DISCONTINUED | OUTPATIENT
Start: 2025-04-18 | End: 2025-04-20

## 2025-04-18 RX ADMIN — SODIUM CHLORIDE, PRESERVATIVE FREE 10 ML: 5 INJECTION INTRAVENOUS at 08:33

## 2025-04-18 RX ADMIN — SODIUM CHLORIDE: 0.45 INJECTION, SOLUTION INTRAVENOUS at 11:13

## 2025-04-18 RX ADMIN — HYDROCORTISONE SODIUM SUCCINATE 50 MG: 100 INJECTION, POWDER, FOR SOLUTION INTRAMUSCULAR; INTRAVENOUS at 11:09

## 2025-04-18 RX ADMIN — APIXABAN 5 MG: 5 TABLET, FILM COATED ORAL at 12:25

## 2025-04-18 RX ADMIN — HYDROCORTISONE SODIUM SUCCINATE 50 MG: 100 INJECTION, POWDER, FOR SOLUTION INTRAMUSCULAR; INTRAVENOUS at 05:12

## 2025-04-18 RX ADMIN — POTASSIUM CHLORIDE 20 MEQ: 29.8 INJECTION, SOLUTION INTRAVENOUS at 04:15

## 2025-04-18 RX ADMIN — HYDROCORTISONE SODIUM SUCCINATE 50 MG: 100 INJECTION, POWDER, FOR SOLUTION INTRAMUSCULAR; INTRAVENOUS at 17:17

## 2025-04-18 RX ADMIN — APIXABAN 5 MG: 5 TABLET, FILM COATED ORAL at 22:14

## 2025-04-18 RX ADMIN — SODIUM CHLORIDE, PRESERVATIVE FREE 10 ML: 5 INJECTION INTRAVENOUS at 22:16

## 2025-04-18 RX ADMIN — SODIUM CHLORIDE: 0.9 INJECTION, SOLUTION INTRAVENOUS at 12:26

## 2025-04-18 RX ADMIN — Medication 100 MG: at 08:32

## 2025-04-18 RX ADMIN — POTASSIUM CHLORIDE 20 MEQ: 29.8 INJECTION, SOLUTION INTRAVENOUS at 02:26

## 2025-04-18 RX ADMIN — FOLIC ACID 1 MG: 1 TABLET ORAL at 08:32

## 2025-04-18 RX ADMIN — HYDROCORTISONE SODIUM SUCCINATE 50 MG: 100 INJECTION, POWDER, FOR SOLUTION INTRAMUSCULAR; INTRAVENOUS at 22:14

## 2025-04-18 RX ADMIN — HEPARIN SODIUM 5000 UNITS: 5000 INJECTION INTRAVENOUS; SUBCUTANEOUS at 05:15

## 2025-04-18 ASSESSMENT — PAIN SCALES - GENERAL
PAINLEVEL_OUTOF10: 0

## 2025-04-18 NOTE — CARE COORDINATION
Case Management   Daily Progress Note       Patient Name: Rasheed Bryant                   YOB: 1965  Diagnosis: Dizziness [R42]  Anticoagulated [Z79.01]  BAR (acute kidney injury) [N17.9]  Acute renal failure, unspecified acute renal failure type [N17.9]  Chest pain, unspecified type [R07.9]                         days  Length of Stay: 1  days    Anticipated Discharge Date: Two or more days before discharge    Readmission Risk (Low < 19, Mod (19-27), High > 27): Readmission Risk Score: 12.8        Current Transitional Plan    [x] Home Independently    [] Home with HC    [] Skilled Nursing Facility    [] Acute Rehabilitation    [] Long Term Acute Care (LTAC)    [] Other:     Plan for the Stay (Medical Management) :  2 L NC, PT/OT ordered.         Workflow Continuation (Additional Notes) :  Pt refuses SNF/HC, has DME walker at home, Brother will transport when dc'd. Has established PCP. Denies any needs. Monitor for  Home O2 needs.       MASSIMO NOLASCO RN  April 18, 2025

## 2025-04-18 NOTE — TRANSITION OF CARE
Critical care team - Resident sign-out to medicine service      Date and time: 4/19/2025 10:10 AM  Patient's name:  Rasheed Bryant  Medical Record Number: [id number]  Patient's account/billing number: [id number]  Patient's YOB: 1965  Age: 59 y.o.  Date of Admission: 4/17/2025  4:45 AM  Length of stay during current admission: 2    Primary Care Physician: Paul Mujica APRN - CNP    Code Status: Full Code    Mode of physician to physician communication:        [x] Via telephone   [] In person     Date and time of sign-out: 4/19/2025 10:10 AM    Accepting Internal Medicine resident: Dr. Gambino    Accepting Medicine team: IM Team 3    Accepting team's attending: Dr. Pompa    Patient's current ICU Bed:  3017     Patient's assigned bed on floor:  324        [x] Med-Surg Monitored [] Step-down       [] Psychiatry ICU       [] Psych floor     Reason for ICU admission:     Hypotensive requiring Levophed, likely hypovolemic shock    ICU course summary:     Marcello Bryant is a 59-year-old male initially presented to [facility] emergency department via EMS on 4/17/2025 with chest pain and dizziness.  Patient possibly had a syncopal event from the night before.  Imaging in the emergency department which included a CT head C-spine chest abdomen pelvis are unremarkable.  Patient received 2 L normal saline vancomycin and Zosyn patient eventually had to be started on norepinephrine for ongoing hypotension.     Patient was found to be hypokalemic at 1.9 with sodium of 125 creatinine of 4 and a BUN of 21.     ICU Course:   4/17: admitted to ICU on Levophed     4/18: Levophed off.  Taper Solu-Cortef.  Restarted Eliquis.     4/19: transfer to med/surg    Procedures during patient's ICU stay:     None    Current Vitals:     BP (!) 152/91   Pulse (!) 109   Temp 97.9 °F (36.6 °C) (Oral)   Resp 14   Ht 1.829 m (6')   Wt 114.9 kg (253 lb 4.9 oz)   SpO2 100%   BMI 34.35 kg/m²       Cultures:

## 2025-04-19 LAB
ANION GAP SERPL CALCULATED.3IONS-SCNC: 13 MMOL/L (ref 9–16)
BACTERIA URNS QL MICRO: ABNORMAL
BASOPHILS # BLD: <0.03 K/UL (ref 0–0.2)
BASOPHILS NFR BLD: 0 % (ref 0–2)
BILIRUB UR QL STRIP: NEGATIVE
BNP SERPL-MCNC: ABNORMAL PG/ML (ref 0–125)
BUN SERPL-MCNC: 10 MG/DL (ref 6–20)
CALCIUM SERPL-MCNC: 8.3 MG/DL (ref 8.6–10.4)
CASTS #/AREA URNS LPF: ABNORMAL /LPF (ref 0–8)
CHLORIDE SERPL-SCNC: 93 MMOL/L (ref 98–107)
CLARITY UR: CLEAR
CO2 SERPL-SCNC: 27 MMOL/L (ref 20–31)
COLOR UR: YELLOW
CREAT SERPL-MCNC: 1 MG/DL (ref 0.7–1.2)
EKG ATRIAL RATE: 51 BPM
EKG Q-T INTERVAL: 418 MS
EKG QRS DURATION: 138 MS
EKG QTC CALCULATION (BAZETT): 530 MS
EKG R AXIS: 46 DEGREES
EKG T AXIS: -81 DEGREES
EKG VENTRICULAR RATE: 97 BPM
EOSINOPHIL # BLD: <0.03 K/UL (ref 0–0.44)
EOSINOPHILS RELATIVE PERCENT: 0 % (ref 1–4)
EPI CELLS #/AREA URNS HPF: ABNORMAL /HPF (ref 0–5)
ERYTHROCYTE [DISTWIDTH] IN BLOOD BY AUTOMATED COUNT: 12.6 % (ref 11.8–14.4)
GFR, ESTIMATED: 88 ML/MIN/1.73M2
GLUCOSE SERPL-MCNC: 94 MG/DL (ref 74–99)
GLUCOSE UR STRIP-MCNC: NEGATIVE MG/DL
HCT VFR BLD AUTO: 34 % (ref 40.7–50.3)
HGB BLD-MCNC: 10.8 G/DL (ref 13–17)
HGB UR QL STRIP.AUTO: ABNORMAL
IMM GRANULOCYTES # BLD AUTO: 0.04 K/UL (ref 0–0.3)
IMM GRANULOCYTES NFR BLD: 1 %
KETONES UR STRIP-MCNC: ABNORMAL MG/DL
LEUKOCYTE ESTERASE UR QL STRIP: NEGATIVE
LYMPHOCYTES NFR BLD: 0.88 K/UL (ref 1.1–3.7)
LYMPHOCYTES RELATIVE PERCENT: 14 % (ref 24–43)
MAGNESIUM SERPL-MCNC: 2.5 MG/DL (ref 1.6–2.6)
MCH RBC QN AUTO: 29.8 PG (ref 25.2–33.5)
MCHC RBC AUTO-ENTMCNC: 31.8 G/DL (ref 28.4–34.8)
MCV RBC AUTO: 93.9 FL (ref 82.6–102.9)
MONOCYTES NFR BLD: 0.45 K/UL (ref 0.1–1.2)
MONOCYTES NFR BLD: 7 % (ref 3–12)
NEUTROPHILS NFR BLD: 78 % (ref 36–65)
NEUTS SEG NFR BLD: 4.96 K/UL (ref 1.5–8.1)
NITRITE UR QL STRIP: NEGATIVE
NRBC BLD-RTO: 0 PER 100 WBC
PH UR STRIP: 8 [PH] (ref 5–8)
PLATELET # BLD AUTO: 185 K/UL (ref 138–453)
PMV BLD AUTO: 9.8 FL (ref 8.1–13.5)
POTASSIUM SERPL-SCNC: 3.2 MMOL/L (ref 3.7–5.3)
PROT UR STRIP-MCNC: NEGATIVE MG/DL
RBC # BLD AUTO: 3.62 M/UL (ref 4.21–5.77)
RBC #/AREA URNS HPF: ABNORMAL /HPF (ref 0–4)
SODIUM SERPL-SCNC: 133 MMOL/L (ref 136–145)
SP GR UR STRIP: 1 (ref 1–1.03)
TROPONIN I SERPL HS-MCNC: 7 NG/L (ref 0–22)
UROBILINOGEN UR STRIP-ACNC: NORMAL EU/DL (ref 0–1)
WBC #/AREA URNS HPF: ABNORMAL /HPF (ref 0–5)
WBC OTHER # BLD: 6.3 K/UL (ref 3.5–11.3)

## 2025-04-19 PROCEDURE — 2500000003 HC RX 250 WO HCPCS

## 2025-04-19 PROCEDURE — 6370000000 HC RX 637 (ALT 250 FOR IP)

## 2025-04-19 PROCEDURE — 51798 US URINE CAPACITY MEASURE: CPT

## 2025-04-19 PROCEDURE — 36415 COLL VENOUS BLD VENIPUNCTURE: CPT

## 2025-04-19 PROCEDURE — 6360000002 HC RX W HCPCS

## 2025-04-19 PROCEDURE — 94761 N-INVAS EAR/PLS OXIMETRY MLT: CPT

## 2025-04-19 PROCEDURE — 85025 COMPLETE CBC W/AUTO DIFF WBC: CPT

## 2025-04-19 PROCEDURE — 83735 ASSAY OF MAGNESIUM: CPT

## 2025-04-19 PROCEDURE — 84484 ASSAY OF TROPONIN QUANT: CPT

## 2025-04-19 PROCEDURE — 1200000000 HC SEMI PRIVATE

## 2025-04-19 PROCEDURE — 81001 URINALYSIS AUTO W/SCOPE: CPT

## 2025-04-19 PROCEDURE — 99233 SBSQ HOSP IP/OBS HIGH 50: CPT | Performed by: STUDENT IN AN ORGANIZED HEALTH CARE EDUCATION/TRAINING PROGRAM

## 2025-04-19 PROCEDURE — 83880 ASSAY OF NATRIURETIC PEPTIDE: CPT

## 2025-04-19 PROCEDURE — 80048 BASIC METABOLIC PNL TOTAL CA: CPT

## 2025-04-19 RX ORDER — ALBUTEROL SULFATE 0.83 MG/ML
2.5 SOLUTION RESPIRATORY (INHALATION)
Status: DISCONTINUED | OUTPATIENT
Start: 2025-04-19 | End: 2025-04-23 | Stop reason: HOSPADM

## 2025-04-19 RX ORDER — QUETIAPINE FUMARATE 25 MG/1
25 TABLET, FILM COATED ORAL 2 TIMES DAILY
Status: DISCONTINUED | OUTPATIENT
Start: 2025-04-19 | End: 2025-04-23 | Stop reason: HOSPADM

## 2025-04-19 RX ORDER — CALCIUM GLUCONATE 20 MG/ML
1000 INJECTION, SOLUTION INTRAVENOUS ONCE
Status: COMPLETED | OUTPATIENT
Start: 2025-04-19 | End: 2025-04-19

## 2025-04-19 RX ORDER — POTASSIUM CHLORIDE 7.45 MG/ML
10 INJECTION INTRAVENOUS ONCE
Status: COMPLETED | OUTPATIENT
Start: 2025-04-19 | End: 2025-04-19

## 2025-04-19 RX ORDER — METOPROLOL TARTRATE 25 MG/1
25 TABLET, FILM COATED ORAL 2 TIMES DAILY
Status: DISCONTINUED | OUTPATIENT
Start: 2025-04-19 | End: 2025-04-22

## 2025-04-19 RX ADMIN — Medication 100 MG: at 10:09

## 2025-04-19 RX ADMIN — POTASSIUM BICARBONATE 40 MEQ: 782 TABLET, EFFERVESCENT ORAL at 01:26

## 2025-04-19 RX ADMIN — HYDROCORTISONE SODIUM SUCCINATE 25 MG: 100 INJECTION, POWDER, FOR SOLUTION INTRAMUSCULAR; INTRAVENOUS at 20:48

## 2025-04-19 RX ADMIN — POTASSIUM CHLORIDE 10 MEQ: 7.46 INJECTION, SOLUTION INTRAVENOUS at 20:51

## 2025-04-19 RX ADMIN — SODIUM CHLORIDE, PRESERVATIVE FREE 10 ML: 5 INJECTION INTRAVENOUS at 20:48

## 2025-04-19 RX ADMIN — METOPROLOL TARTRATE 25 MG: 25 TABLET, FILM COATED ORAL at 20:48

## 2025-04-19 RX ADMIN — QUETIAPINE FUMARATE 25 MG: 25 TABLET ORAL at 10:09

## 2025-04-19 RX ADMIN — CALCIUM GLUCONATE 1000 MG: 20 INJECTION, SOLUTION INTRAVENOUS at 22:23

## 2025-04-19 RX ADMIN — SODIUM CHLORIDE, PRESERVATIVE FREE 10 ML: 5 INJECTION INTRAVENOUS at 10:21

## 2025-04-19 RX ADMIN — POTASSIUM CHLORIDE 10 MEQ: 7.46 INJECTION, SOLUTION INTRAVENOUS at 14:15

## 2025-04-19 RX ADMIN — HYDROCORTISONE SODIUM SUCCINATE 25 MG: 100 INJECTION, POWDER, FOR SOLUTION INTRAMUSCULAR; INTRAVENOUS at 14:05

## 2025-04-19 RX ADMIN — APIXABAN 5 MG: 5 TABLET, FILM COATED ORAL at 10:09

## 2025-04-19 RX ADMIN — APIXABAN 5 MG: 5 TABLET, FILM COATED ORAL at 20:48

## 2025-04-19 RX ADMIN — POTASSIUM CHLORIDE 10 MEQ: 7.46 INJECTION, SOLUTION INTRAVENOUS at 18:22

## 2025-04-19 RX ADMIN — QUETIAPINE FUMARATE 25 MG: 25 TABLET ORAL at 20:48

## 2025-04-19 RX ADMIN — HYDROCORTISONE SODIUM SUCCINATE 25 MG: 100 INJECTION, POWDER, FOR SOLUTION INTRAMUSCULAR; INTRAVENOUS at 05:12

## 2025-04-19 RX ADMIN — POTASSIUM CHLORIDE 10 MEQ: 7.46 INJECTION, SOLUTION INTRAVENOUS at 19:25

## 2025-04-19 RX ADMIN — FOLIC ACID 1 MG: 1 TABLET ORAL at 10:09

## 2025-04-19 RX ADMIN — Medication 5 MG: at 20:48

## 2025-04-19 ASSESSMENT — PAIN SCALES - GENERAL: PAINLEVEL_OUTOF10: 1
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
## 2025-04-20 PROBLEM — R07.9 CHEST PAIN: Status: ACTIVE | Noted: 2025-04-20

## 2025-04-20 LAB
ANION GAP SERPL CALCULATED.3IONS-SCNC: 14 MMOL/L (ref 9–16)
ANION GAP SERPL CALCULATED.3IONS-SCNC: 9 MMOL/L (ref 9–16)
BASOPHILS # BLD: <0.03 K/UL (ref 0–0.2)
BASOPHILS NFR BLD: 0 % (ref 0–2)
BUN SERPL-MCNC: 10 MG/DL (ref 6–20)
BUN SERPL-MCNC: 11 MG/DL (ref 6–20)
BUN SERPL-MCNC: 12 MG/DL (ref 6–20)
BUN SERPL-MCNC: 9 MG/DL (ref 6–20)
CALCIUM SERPL-MCNC: 8.5 MG/DL (ref 8.6–10.4)
CALCIUM SERPL-MCNC: 8.5 MG/DL (ref 8.6–10.4)
CALCIUM SERPL-MCNC: 8.6 MG/DL (ref 8.6–10.4)
CALCIUM SERPL-MCNC: 8.8 MG/DL (ref 8.6–10.4)
CHLORIDE SERPL-SCNC: 94 MMOL/L (ref 98–107)
CHLORIDE SERPL-SCNC: 96 MMOL/L (ref 98–107)
CHLORIDE SERPL-SCNC: 98 MMOL/L (ref 98–107)
CHLORIDE SERPL-SCNC: 98 MMOL/L (ref 98–107)
CO2 SERPL-SCNC: 27 MMOL/L (ref 20–31)
CO2 SERPL-SCNC: 27 MMOL/L (ref 20–31)
CO2 SERPL-SCNC: 29 MMOL/L (ref 20–31)
CO2 SERPL-SCNC: 30 MMOL/L (ref 20–31)
CREAT SERPL-MCNC: 1 MG/DL (ref 0.7–1.2)
CREAT SERPL-MCNC: 1.1 MG/DL (ref 0.7–1.2)
EKG ATRIAL RATE: 288 BPM
EKG Q-T INTERVAL: 364 MS
EKG QRS DURATION: 100 MS
EKG QTC CALCULATION (BAZETT): 490 MS
EKG R AXIS: 14 DEGREES
EKG T AXIS: 38 DEGREES
EKG VENTRICULAR RATE: 109 BPM
EOSINOPHIL # BLD: <0.03 K/UL (ref 0–0.44)
EOSINOPHILS RELATIVE PERCENT: 0 % (ref 1–4)
ERYTHROCYTE [DISTWIDTH] IN BLOOD BY AUTOMATED COUNT: 12.7 % (ref 11.8–14.4)
FOLATE SERPL-MCNC: 19.2 NG/ML (ref 4.8–24.2)
GFR, ESTIMATED: 78 ML/MIN/1.73M2
GFR, ESTIMATED: 88 ML/MIN/1.73M2
GLUCOSE SERPL-MCNC: 104 MG/DL (ref 74–99)
GLUCOSE SERPL-MCNC: 107 MG/DL (ref 74–99)
GLUCOSE SERPL-MCNC: 117 MG/DL (ref 74–99)
GLUCOSE SERPL-MCNC: 120 MG/DL (ref 74–99)
HCT VFR BLD AUTO: 37.9 % (ref 40.7–50.3)
HGB BLD-MCNC: 11.8 G/DL (ref 13–17)
IMM GRANULOCYTES # BLD AUTO: 0.05 K/UL (ref 0–0.3)
IMM GRANULOCYTES NFR BLD: 1 %
IRON SATN MFR SERPL: 41 % (ref 20–55)
IRON SERPL-MCNC: 70 UG/DL (ref 61–157)
LYMPHOCYTES NFR BLD: 1.33 K/UL (ref 1.1–3.7)
LYMPHOCYTES RELATIVE PERCENT: 19 % (ref 24–43)
MAGNESIUM SERPL-MCNC: 2 MG/DL (ref 1.6–2.6)
MAGNESIUM SERPL-MCNC: 2.2 MG/DL (ref 1.6–2.6)
MAGNESIUM SERPL-MCNC: 2.3 MG/DL (ref 1.6–2.6)
MCH RBC QN AUTO: 29.8 PG (ref 25.2–33.5)
MCHC RBC AUTO-ENTMCNC: 31.1 G/DL (ref 28.4–34.8)
MCV RBC AUTO: 95.7 FL (ref 82.6–102.9)
MONOCYTES NFR BLD: 0.46 K/UL (ref 0.1–1.2)
MONOCYTES NFR BLD: 7 % (ref 3–12)
NEUTROPHILS NFR BLD: 73 % (ref 36–65)
NEUTS SEG NFR BLD: 5.14 K/UL (ref 1.5–8.1)
NRBC BLD-RTO: 0 PER 100 WBC
PLATELET # BLD AUTO: 203 K/UL (ref 138–453)
PMV BLD AUTO: 10 FL (ref 8.1–13.5)
POTASSIUM SERPL-SCNC: 3.1 MMOL/L (ref 3.7–5.3)
POTASSIUM SERPL-SCNC: 3.3 MMOL/L (ref 3.7–5.3)
POTASSIUM SERPL-SCNC: 3.5 MMOL/L (ref 3.7–5.3)
POTASSIUM SERPL-SCNC: 4 MMOL/L (ref 3.7–5.3)
RBC # BLD AUTO: 3.96 M/UL (ref 4.21–5.77)
SODIUM SERPL-SCNC: 134 MMOL/L (ref 136–145)
SODIUM SERPL-SCNC: 135 MMOL/L (ref 136–145)
SODIUM SERPL-SCNC: 135 MMOL/L (ref 136–145)
SODIUM SERPL-SCNC: 136 MMOL/L (ref 136–145)
TIBC SERPL-MCNC: 170 UG/DL (ref 250–450)
UNSATURATED IRON BINDING CAPACITY: 100 UG/DL (ref 112–347)
VIT B12 SERPL-MCNC: 691 PG/ML (ref 232–1245)
WBC OTHER # BLD: 7 K/UL (ref 3.5–11.3)

## 2025-04-20 PROCEDURE — 82607 VITAMIN B-12: CPT

## 2025-04-20 PROCEDURE — 85025 COMPLETE CBC W/AUTO DIFF WBC: CPT

## 2025-04-20 PROCEDURE — 2500000003 HC RX 250 WO HCPCS

## 2025-04-20 PROCEDURE — 83540 ASSAY OF IRON: CPT

## 2025-04-20 PROCEDURE — 6370000000 HC RX 637 (ALT 250 FOR IP)

## 2025-04-20 PROCEDURE — 6360000002 HC RX W HCPCS

## 2025-04-20 PROCEDURE — 93005 ELECTROCARDIOGRAM TRACING: CPT | Performed by: STUDENT IN AN ORGANIZED HEALTH CARE EDUCATION/TRAINING PROGRAM

## 2025-04-20 PROCEDURE — 99223 1ST HOSP IP/OBS HIGH 75: CPT | Performed by: STUDENT IN AN ORGANIZED HEALTH CARE EDUCATION/TRAINING PROGRAM

## 2025-04-20 PROCEDURE — 36415 COLL VENOUS BLD VENIPUNCTURE: CPT

## 2025-04-20 PROCEDURE — 83550 IRON BINDING TEST: CPT

## 2025-04-20 PROCEDURE — 93005 ELECTROCARDIOGRAM TRACING: CPT

## 2025-04-20 PROCEDURE — 99233 SBSQ HOSP IP/OBS HIGH 50: CPT | Performed by: INTERNAL MEDICINE

## 2025-04-20 PROCEDURE — 1200000000 HC SEMI PRIVATE

## 2025-04-20 PROCEDURE — 83735 ASSAY OF MAGNESIUM: CPT

## 2025-04-20 PROCEDURE — 82746 ASSAY OF FOLIC ACID SERUM: CPT

## 2025-04-20 PROCEDURE — 80048 BASIC METABOLIC PNL TOTAL CA: CPT

## 2025-04-20 RX ORDER — HYDROCORTISONE SODIUM SUCCINATE 100 MG/2ML
25 INJECTION INTRAMUSCULAR; INTRAVENOUS 2 TIMES DAILY
Status: COMPLETED | OUTPATIENT
Start: 2025-04-20 | End: 2025-04-20

## 2025-04-20 RX ORDER — PREDNISONE 20 MG/1
20 TABLET ORAL DAILY
Status: DISCONTINUED | OUTPATIENT
Start: 2025-04-21 | End: 2025-04-21

## 2025-04-20 RX ORDER — SODIUM CHLORIDE 0.9 % (FLUSH) 0.9 %
5-40 SYRINGE (ML) INJECTION PRN
Status: CANCELLED | OUTPATIENT
Start: 2025-04-20 | End: 2025-04-20

## 2025-04-20 RX ORDER — PREDNISONE 5 MG/1
5 TABLET ORAL DAILY
Status: DISCONTINUED | OUTPATIENT
Start: 2025-04-30 | End: 2025-04-21

## 2025-04-20 RX ORDER — NITROGLYCERIN 0.4 MG/1
0.4 TABLET SUBLINGUAL EVERY 5 MIN PRN
Status: CANCELLED | OUTPATIENT
Start: 2025-04-20 | End: 2025-04-20

## 2025-04-20 RX ORDER — POTASSIUM CHLORIDE 1500 MG/1
40 TABLET, EXTENDED RELEASE ORAL ONCE
Status: COMPLETED | OUTPATIENT
Start: 2025-04-20 | End: 2025-04-20

## 2025-04-20 RX ORDER — METOPROLOL TARTRATE 25 MG/1
25 TABLET, FILM COATED ORAL ONCE
Status: COMPLETED | OUTPATIENT
Start: 2025-04-20 | End: 2025-04-20

## 2025-04-20 RX ORDER — REGADENOSON 0.08 MG/ML
0.4 INJECTION, SOLUTION INTRAVENOUS
Status: CANCELLED | OUTPATIENT
Start: 2025-04-20

## 2025-04-20 RX ORDER — HYDROCORTISONE SODIUM SUCCINATE 100 MG/2ML
25 INJECTION INTRAMUSCULAR; INTRAVENOUS 2 TIMES DAILY
Status: DISCONTINUED | OUTPATIENT
Start: 2025-04-20 | End: 2025-04-20

## 2025-04-20 RX ORDER — PANTOPRAZOLE SODIUM 40 MG/1
40 TABLET, DELAYED RELEASE ORAL
Status: DISCONTINUED | OUTPATIENT
Start: 2025-04-21 | End: 2025-04-23 | Stop reason: HOSPADM

## 2025-04-20 RX ORDER — METOPROLOL TARTRATE 1 MG/ML
5 INJECTION, SOLUTION INTRAVENOUS EVERY 6 HOURS PRN
Status: DISCONTINUED | OUTPATIENT
Start: 2025-04-20 | End: 2025-04-23 | Stop reason: HOSPADM

## 2025-04-20 RX ORDER — METOPROLOL TARTRATE 1 MG/ML
5 INJECTION, SOLUTION INTRAVENOUS EVERY 5 MIN PRN
Status: CANCELLED | OUTPATIENT
Start: 2025-04-20 | End: 2025-04-20

## 2025-04-20 RX ORDER — PREDNISONE 20 MG/1
10 TABLET ORAL DAILY
Status: DISCONTINUED | OUTPATIENT
Start: 2025-04-27 | End: 2025-04-21

## 2025-04-20 RX ORDER — SODIUM CHLORIDE 9 MG/ML
500 INJECTION, SOLUTION INTRAVENOUS CONTINUOUS PRN
Status: CANCELLED | OUTPATIENT
Start: 2025-04-20 | End: 2025-04-20

## 2025-04-20 RX ORDER — DIGOXIN 0.25 MG/ML
250 INJECTION INTRAMUSCULAR; INTRAVENOUS EVERY 4 HOURS
Status: COMPLETED | OUTPATIENT
Start: 2025-04-20 | End: 2025-04-20

## 2025-04-20 RX ORDER — AMINOPHYLLINE 25 MG/ML
50 INJECTION, SOLUTION INTRAVENOUS PRN
Status: CANCELLED | OUTPATIENT
Start: 2025-04-20 | End: 2025-04-20

## 2025-04-20 RX ORDER — ATROPINE SULFATE 0.1 MG/ML
0.5 INJECTION INTRAVENOUS EVERY 5 MIN PRN
Status: CANCELLED | OUTPATIENT
Start: 2025-04-20 | End: 2025-04-20

## 2025-04-20 RX ADMIN — SODIUM CHLORIDE, PRESERVATIVE FREE 10 ML: 5 INJECTION INTRAVENOUS at 20:17

## 2025-04-20 RX ADMIN — DIGOXIN 250 MCG: 0.25 INJECTION INTRAMUSCULAR; INTRAVENOUS at 15:23

## 2025-04-20 RX ADMIN — METOPROLOL TARTRATE 25 MG: 25 TABLET, FILM COATED ORAL at 20:17

## 2025-04-20 RX ADMIN — ACETAMINOPHEN 650 MG: 325 TABLET ORAL at 15:46

## 2025-04-20 RX ADMIN — FOLIC ACID 1 MG: 1 TABLET ORAL at 08:44

## 2025-04-20 RX ADMIN — DIGOXIN 250 MCG: 0.25 INJECTION INTRAMUSCULAR; INTRAVENOUS at 20:17

## 2025-04-20 RX ADMIN — Medication 100 MG: at 08:44

## 2025-04-20 RX ADMIN — POTASSIUM CHLORIDE 40 MEQ: 1500 TABLET, EXTENDED RELEASE ORAL at 12:34

## 2025-04-20 RX ADMIN — POTASSIUM CHLORIDE 10 MEQ: 7.46 INJECTION, SOLUTION INTRAVENOUS at 08:50

## 2025-04-20 RX ADMIN — HYDROCORTISONE SODIUM SUCCINATE 25 MG: 100 INJECTION, POWDER, FOR SOLUTION INTRAMUSCULAR; INTRAVENOUS at 08:44

## 2025-04-20 RX ADMIN — APIXABAN 5 MG: 5 TABLET, FILM COATED ORAL at 20:17

## 2025-04-20 RX ADMIN — QUETIAPINE FUMARATE 25 MG: 25 TABLET ORAL at 20:17

## 2025-04-20 RX ADMIN — Medication 5 MG: at 20:17

## 2025-04-20 RX ADMIN — HYDROCORTISONE SODIUM SUCCINATE 25 MG: 100 INJECTION, POWDER, FOR SOLUTION INTRAMUSCULAR; INTRAVENOUS at 01:31

## 2025-04-20 RX ADMIN — ACETAMINOPHEN 650 MG: 325 TABLET ORAL at 05:20

## 2025-04-20 RX ADMIN — APIXABAN 5 MG: 5 TABLET, FILM COATED ORAL at 08:44

## 2025-04-20 RX ADMIN — METOPROLOL TARTRATE 25 MG: 25 TABLET, FILM COATED ORAL at 08:44

## 2025-04-20 RX ADMIN — METOPROLOL TARTRATE 25 MG: 25 TABLET, FILM COATED ORAL at 15:08

## 2025-04-20 RX ADMIN — SODIUM CHLORIDE, PRESERVATIVE FREE 10 ML: 5 INJECTION INTRAVENOUS at 05:21

## 2025-04-20 RX ADMIN — HYDROCORTISONE SODIUM SUCCINATE 25 MG: 100 INJECTION, POWDER, FOR SOLUTION INTRAMUSCULAR; INTRAVENOUS at 20:17

## 2025-04-20 RX ADMIN — POTASSIUM CHLORIDE 40 MEQ: 1500 TABLET, EXTENDED RELEASE ORAL at 22:31

## 2025-04-20 RX ADMIN — QUETIAPINE FUMARATE 25 MG: 25 TABLET ORAL at 08:44

## 2025-04-20 RX ADMIN — SODIUM CHLORIDE, PRESERVATIVE FREE 10 ML: 5 INJECTION INTRAVENOUS at 08:44

## 2025-04-20 RX ADMIN — POTASSIUM CHLORIDE 10 MEQ: 7.46 INJECTION, SOLUTION INTRAVENOUS at 10:54

## 2025-04-20 ASSESSMENT — PAIN DESCRIPTION - ORIENTATION: ORIENTATION: LEFT

## 2025-04-20 ASSESSMENT — PAIN SCALES - GENERAL
PAINLEVEL_OUTOF10: 0
PAINLEVEL_OUTOF10: 0
PAINLEVEL_OUTOF10: 2
PAINLEVEL_OUTOF10: 10
PAINLEVEL_OUTOF10: 6

## 2025-04-20 ASSESSMENT — PAIN SCALES - WONG BAKER: WONGBAKER_NUMERICALRESPONSE: NO HURT

## 2025-04-20 ASSESSMENT — PAIN DESCRIPTION - LOCATION: LOCATION: ARM

## 2025-04-20 ASSESSMENT — PAIN DESCRIPTION - DESCRIPTORS: DESCRIPTORS: ACHING

## 2025-04-20 NOTE — CARE COORDINATION
Case Management   Daily Progress Note       Patient Name: Rasheed Bryant                   YOB: 1965  Diagnosis: Dizziness [R42]  Anticoagulated [Z79.01]  BAR (acute kidney injury) [N17.9]  Acute renal failure, unspecified acute renal failure type [N17.9]  Chest pain, unspecified type [R07.9]                       GMLOS: 4.4 days  Length of Stay: 3  days    Anticipated Discharge Date: One day until discharge    Readmission Risk (Low < 19, Mod (19-27), High > 27): Readmission Risk Score: 13.4        Current Transitional Plan    [x] Home Independently    [] Home with HC    [] Skilled Nursing Facility    [] Acute Rehabilitation    [] Long Term Acute Care (LTAC)    [] Other:     Plan for the Stay (Medical Management) :  ECHO   Electrolyte replacement          Workflow Continuation (Additional Notes) :home independently  Refuses HC and SNF        Michelle Noriega RN  April 20, 2025

## 2025-04-20 NOTE — CONSULTS
Attestation signed by      Attending Physician Statement:    I have discussed the care of  Rasheed Bryant , including pertinent history and exam findings, with the Cardiology fellow/resident.     I have seen and examined the patient and the key elements of all parts of the encounter have been performed by me. I agree with the assessment, plan and orders as documented by the fellow/resident, after I modified exam findings and plan of treatments, and the final version is my approved version of the assessment.     Saul Salguero MD, FAC, [facility]  Cardiology, Interventional Cardiology, [facility]               [facility]    Consult / H&P               Today's Date: 4/20/2025  Patient Name: Rasheed Bryant  Date of admission: 4/17/2025  4:45 AM  Patient's age: 59 y.o., 1965  Admission Dx: Dizziness [R42]  Anticoagulated [Z79.01]  BAR (acute kidney injury) [N17.9]  Acute renal failure, unspecified acute renal failure type [N17.9]  Chest pain, unspecified type [R07.9]    Reason for Consult:  Cardiac evaluation    Requesting Physician: Talat Hills MD    CHIEF COMPLAINT: Chest pain and dizziness    History Obtained From:  patient, electronic medical record    HISTORY OF PRESENT ILLNESS:      The patient is a 59 y.o.  male who is admitted to the hospital for chest pain and dizziness.  Patient has past medical history of A-fib on Eliquis, peripheral arterial disease, alcohol abuse, acute on chronic heart failure, hyperlipidemia, essential hypertension.    Patient initially presented to ER for chest pain and dizziness.  He reports that pain was sudden in onset, episodic, left-sided, pressure-like, radiating to back and his arms.  It resolved on taking nitroglycerin.  On initial admission, it was noted that the patient's sodium was 125, potassium 1.9, BUN 21, creatinine 4, anion gap 36, CK16 100, myoglobin 3600.  Patient was hypotensive and eventually transferred to ICU

## 2025-04-21 ENCOUNTER — HOSPITAL ENCOUNTER (INPATIENT)
Age: 60
Discharge: HOME | DRG: 422 | End: 2025-04-23
Payer: MEDICAID

## 2025-04-21 ENCOUNTER — APPOINTMENT (OUTPATIENT)
Dept: NUCLEAR MEDICINE | Age: 60
DRG: 422 | End: 2025-04-21
Payer: MEDICAID

## 2025-04-21 LAB
ANION GAP SERPL CALCULATED.3IONS-SCNC: 8 MMOL/L (ref 9–16)
ANION GAP SERPL CALCULATED.3IONS-SCNC: 8 MMOL/L (ref 9–16)
BASOPHILS # BLD: <0.03 K/UL (ref 0–0.2)
BASOPHILS NFR BLD: 0 % (ref 0–2)
BUN SERPL-MCNC: 12 MG/DL (ref 6–20)
CALCIUM SERPL-MCNC: 8.5 MG/DL (ref 8.6–10.4)
CHLORIDE SERPL-SCNC: 98 MMOL/L (ref 98–107)
CHLORIDE SERPL-SCNC: 99 MMOL/L (ref 98–107)
CHOLEST SERPL-MCNC: 102 MG/DL (ref 0–199)
CHOLESTEROL/HDL RATIO: 2
CO2 SERPL-SCNC: 27 MMOL/L (ref 20–31)
CO2 SERPL-SCNC: 28 MMOL/L (ref 20–31)
CREAT SERPL-MCNC: 1 MG/DL (ref 0.7–1.2)
ECHO BSA: 2.47 M2
EOSINOPHIL # BLD: <0.03 K/UL (ref 0–0.44)
EOSINOPHILS RELATIVE PERCENT: 0 % (ref 1–4)
ERYTHROCYTE [DISTWIDTH] IN BLOOD BY AUTOMATED COUNT: 12.6 % (ref 11.8–14.4)
GFR, ESTIMATED: 88 ML/MIN/1.73M2
GLUCOSE SERPL-MCNC: 114 MG/DL (ref 74–99)
HCT VFR BLD AUTO: 34.2 % (ref 40.7–50.3)
HDLC SERPL-MCNC: 52 MG/DL
HGB BLD-MCNC: 10.9 G/DL (ref 13–17)
IMM GRANULOCYTES # BLD AUTO: 0.05 K/UL (ref 0–0.3)
IMM GRANULOCYTES NFR BLD: 1 %
LACTIC ACID, WHOLE BLOOD: 0.9 MMOL/L (ref 0.7–2.1)
LDLC SERPL CALC-MCNC: 35 MG/DL (ref 0–100)
LYMPHOCYTES NFR BLD: 0.96 K/UL (ref 1.1–3.7)
LYMPHOCYTES RELATIVE PERCENT: 18 % (ref 24–43)
MCH RBC QN AUTO: 29.9 PG (ref 25.2–33.5)
MCHC RBC AUTO-ENTMCNC: 31.9 G/DL (ref 28.4–34.8)
MCV RBC AUTO: 93.7 FL (ref 82.6–102.9)
MONOCYTES NFR BLD: 0.36 K/UL (ref 0.1–1.2)
MONOCYTES NFR BLD: 7 % (ref 3–12)
NEUTROPHILS NFR BLD: 74 % (ref 36–65)
NEUTS SEG NFR BLD: 3.99 K/UL (ref 1.5–8.1)
NRBC BLD-RTO: 0 PER 100 WBC
NUC STRESS EJECTION FRACTION: 66 %
PLATELET # BLD AUTO: 175 K/UL (ref 138–453)
PMV BLD AUTO: 9.6 FL (ref 8.1–13.5)
POTASSIUM SERPL-SCNC: 3.9 MMOL/L (ref 3.7–5.3)
POTASSIUM SERPL-SCNC: 4 MMOL/L (ref 3.7–5.3)
RBC # BLD AUTO: 3.65 M/UL (ref 4.21–5.77)
SODIUM SERPL-SCNC: 134 MMOL/L (ref 136–145)
SODIUM SERPL-SCNC: 134 MMOL/L (ref 136–145)
STRESS BASELINE DIAS BP: 52 MMHG
STRESS BASELINE HR: 70 BPM
STRESS BASELINE SYS BP: 157 MMHG
STRESS ESTIMATED WORKLOAD: 1 METS
STRESS PEAK DIAS BP: 52 MMHG
STRESS PEAK SYS BP: 157 MMHG
STRESS PERCENT HR ACHIEVED: 92 %
STRESS POST PEAK HR: 150 BPM
STRESS RATE PRESSURE PRODUCT: NORMAL BPM*MMHG
STRESS TARGET HR: 163 BPM
TID: 1.17
TRIGL SERPL-MCNC: 77 MG/DL
VLDLC SERPL CALC-MCNC: 15 MG/DL (ref 1–30)
WBC OTHER # BLD: 5.4 K/UL (ref 3.5–11.3)

## 2025-04-21 PROCEDURE — 6370000000 HC RX 637 (ALT 250 FOR IP)

## 2025-04-21 PROCEDURE — 80061 LIPID PANEL: CPT

## 2025-04-21 PROCEDURE — 85025 COMPLETE CBC W/AUTO DIFF WBC: CPT

## 2025-04-21 PROCEDURE — 3430000000 HC RX DIAGNOSTIC RADIOPHARMACEUTICAL

## 2025-04-21 PROCEDURE — 99232 SBSQ HOSP IP/OBS MODERATE 35: CPT | Performed by: STUDENT IN AN ORGANIZED HEALTH CARE EDUCATION/TRAINING PROGRAM

## 2025-04-21 PROCEDURE — 2500000003 HC RX 250 WO HCPCS

## 2025-04-21 PROCEDURE — 99233 SBSQ HOSP IP/OBS HIGH 50: CPT | Performed by: NURSE PRACTITIONER

## 2025-04-21 PROCEDURE — 36415 COLL VENOUS BLD VENIPUNCTURE: CPT

## 2025-04-21 PROCEDURE — 93017 CV STRESS TEST TRACING ONLY: CPT

## 2025-04-21 PROCEDURE — 2580000003 HC RX 258

## 2025-04-21 PROCEDURE — 6360000002 HC RX W HCPCS

## 2025-04-21 PROCEDURE — 1200000000 HC SEMI PRIVATE

## 2025-04-21 PROCEDURE — A9500 TC99M SESTAMIBI: HCPCS

## 2025-04-21 PROCEDURE — 93018 CV STRESS TEST I&R ONLY: CPT | Performed by: INTERNAL MEDICINE

## 2025-04-21 PROCEDURE — 78452 HT MUSCLE IMAGE SPECT MULT: CPT

## 2025-04-21 PROCEDURE — 83605 ASSAY OF LACTIC ACID: CPT

## 2025-04-21 PROCEDURE — 80048 BASIC METABOLIC PNL TOTAL CA: CPT

## 2025-04-21 PROCEDURE — 80051 ELECTROLYTE PANEL: CPT

## 2025-04-21 RX ORDER — METOPROLOL TARTRATE 1 MG/ML
5 INJECTION, SOLUTION INTRAVENOUS EVERY 5 MIN PRN
Status: DISCONTINUED | OUTPATIENT
Start: 2025-04-21 | End: 2025-04-21

## 2025-04-21 RX ORDER — TETRAKIS(2-METHOXYISOBUTYLISOCYANIDE)COPPER(I) TETRAFLUOROBORATE 1 MG/ML
14.3 INJECTION, POWDER, LYOPHILIZED, FOR SOLUTION INTRAVENOUS
Status: COMPLETED | OUTPATIENT
Start: 2025-04-21 | End: 2025-04-21

## 2025-04-21 RX ORDER — ATROPINE SULFATE 0.1 MG/ML
0.5 INJECTION INTRAVENOUS EVERY 5 MIN PRN
Status: DISCONTINUED | OUTPATIENT
Start: 2025-04-21 | End: 2025-04-21

## 2025-04-21 RX ORDER — DEXTROSE MONOHYDRATE AND SODIUM CHLORIDE 5; .9 G/100ML; G/100ML
INJECTION, SOLUTION INTRAVENOUS CONTINUOUS
Status: DISCONTINUED | OUTPATIENT
Start: 2025-04-21 | End: 2025-04-22

## 2025-04-21 RX ORDER — MULTIVITAMIN WITH IRON
1 TABLET ORAL DAILY
Status: DISCONTINUED | OUTPATIENT
Start: 2025-04-21 | End: 2025-04-23 | Stop reason: HOSPADM

## 2025-04-21 RX ORDER — SODIUM CHLORIDE 0.9 % (FLUSH) 0.9 %
10 SYRINGE (ML) INJECTION PRN
Status: DISCONTINUED | OUTPATIENT
Start: 2025-04-21 | End: 2025-04-23 | Stop reason: HOSPADM

## 2025-04-21 RX ORDER — TETRAKIS(2-METHOXYISOBUTYLISOCYANIDE)COPPER(I) TETRAFLUOROBORATE 1 MG/ML
930 INJECTION, POWDER, LYOPHILIZED, FOR SOLUTION INTRAVENOUS
Status: DISCONTINUED | OUTPATIENT
Start: 2025-04-21 | End: 2025-04-21

## 2025-04-21 RX ORDER — SODIUM CHLORIDE 0.9 % (FLUSH) 0.9 %
5-40 SYRINGE (ML) INJECTION PRN
Status: DISCONTINUED | OUTPATIENT
Start: 2025-04-21 | End: 2025-04-21

## 2025-04-21 RX ORDER — AMINOPHYLLINE 25 MG/ML
50 INJECTION, SOLUTION INTRAVENOUS PRN
Status: DISCONTINUED | OUTPATIENT
Start: 2025-04-21 | End: 2025-04-21

## 2025-04-21 RX ORDER — TETRAKIS(2-METHOXYISOBUTYLISOCYANIDE)COPPER(I) TETRAFLUOROBORATE 1 MG/ML
1100 INJECTION, POWDER, LYOPHILIZED, FOR SOLUTION INTRAVENOUS
Status: DISCONTINUED | OUTPATIENT
Start: 2025-04-21 | End: 2025-04-21

## 2025-04-21 RX ORDER — SODIUM CHLORIDE 9 MG/ML
500 INJECTION, SOLUTION INTRAVENOUS CONTINUOUS PRN
Status: DISCONTINUED | OUTPATIENT
Start: 2025-04-21 | End: 2025-04-21

## 2025-04-21 RX ORDER — PREDNISONE 20 MG/1
20 TABLET ORAL DAILY
Status: DISCONTINUED | OUTPATIENT
Start: 2025-04-22 | End: 2025-04-23 | Stop reason: HOSPADM

## 2025-04-21 RX ORDER — TETRAKIS(2-METHOXYISOBUTYLISOCYANIDE)COPPER(I) TETRAFLUOROBORATE 1 MG/ML
41.3 INJECTION, POWDER, LYOPHILIZED, FOR SOLUTION INTRAVENOUS
Status: COMPLETED | OUTPATIENT
Start: 2025-04-21 | End: 2025-04-21

## 2025-04-21 RX ORDER — NITROGLYCERIN 0.4 MG/1
0.4 TABLET SUBLINGUAL EVERY 5 MIN PRN
Status: DISCONTINUED | OUTPATIENT
Start: 2025-04-21 | End: 2025-04-21

## 2025-04-21 RX ORDER — REGADENOSON 0.08 MG/ML
0.4 INJECTION, SOLUTION INTRAVENOUS
Status: COMPLETED | OUTPATIENT
Start: 2025-04-21 | End: 2025-04-21

## 2025-04-21 RX ADMIN — SODIUM CHLORIDE, PRESERVATIVE FREE 10 ML: 5 INJECTION INTRAVENOUS at 11:00

## 2025-04-21 RX ADMIN — QUETIAPINE FUMARATE 25 MG: 25 TABLET ORAL at 08:48

## 2025-04-21 RX ADMIN — DEXTROSE AND SODIUM CHLORIDE: 5; 900 INJECTION, SOLUTION INTRAVENOUS at 08:51

## 2025-04-21 RX ADMIN — SODIUM CHLORIDE, PRESERVATIVE FREE 10 ML: 5 INJECTION INTRAVENOUS at 10:59

## 2025-04-21 RX ADMIN — Medication 100 MG: at 08:48

## 2025-04-21 RX ADMIN — METOPROLOL TARTRATE 25 MG: 25 TABLET, FILM COATED ORAL at 20:55

## 2025-04-21 RX ADMIN — QUETIAPINE FUMARATE 25 MG: 25 TABLET ORAL at 20:55

## 2025-04-21 RX ADMIN — TETRAKIS(2-METHOXYISOBUTYLISOCYANIDE)COPPER(I) TETRAFLUOROBORATE 14.3 MILLICURIE: 1 INJECTION, POWDER, LYOPHILIZED, FOR SOLUTION INTRAVENOUS at 09:30

## 2025-04-21 RX ADMIN — METOPROLOL TARTRATE 5 MG: 5 INJECTION INTRAVENOUS at 18:23

## 2025-04-21 RX ADMIN — REGADENOSON 0.4 MG: 0.08 INJECTION, SOLUTION INTRAVENOUS at 10:59

## 2025-04-21 RX ADMIN — APIXABAN 5 MG: 5 TABLET, FILM COATED ORAL at 20:55

## 2025-04-21 RX ADMIN — PREDNISONE 20 MG: 20 TABLET ORAL at 12:31

## 2025-04-21 RX ADMIN — PANTOPRAZOLE SODIUM 40 MG: 40 TABLET, DELAYED RELEASE ORAL at 05:45

## 2025-04-21 RX ADMIN — APIXABAN 5 MG: 5 TABLET, FILM COATED ORAL at 08:48

## 2025-04-21 RX ADMIN — SODIUM CHLORIDE, PRESERVATIVE FREE 10 ML: 5 INJECTION INTRAVENOUS at 09:30

## 2025-04-21 RX ADMIN — FOLIC ACID 1 MG: 1 TABLET ORAL at 08:48

## 2025-04-21 RX ADMIN — SODIUM CHLORIDE, PRESERVATIVE FREE 10 ML: 5 INJECTION INTRAVENOUS at 10:50

## 2025-04-21 RX ADMIN — ACETAMINOPHEN 650 MG: 325 TABLET ORAL at 08:47

## 2025-04-21 RX ADMIN — TETRAKIS(2-METHOXYISOBUTYLISOCYANIDE)COPPER(I) TETRAFLUOROBORATE 41.3 MILLICURIE: 1 INJECTION, POWDER, LYOPHILIZED, FOR SOLUTION INTRAVENOUS at 11:00

## 2025-04-21 RX ADMIN — THERA TABS 1 TABLET: TAB at 08:47

## 2025-04-21 RX ADMIN — METOPROLOL TARTRATE 25 MG: 25 TABLET, FILM COATED ORAL at 12:31

## 2025-04-21 RX ADMIN — ACETAMINOPHEN 650 MG: 325 TABLET ORAL at 20:55

## 2025-04-21 RX ADMIN — SODIUM CHLORIDE, PRESERVATIVE FREE 10 ML: 5 INJECTION INTRAVENOUS at 08:49

## 2025-04-21 ASSESSMENT — PAIN SCALES - GENERAL
PAINLEVEL_OUTOF10: 3
PAINLEVEL_OUTOF10: 0
PAINLEVEL_OUTOF10: 0
PAINLEVEL_OUTOF10: 1
PAINLEVEL_OUTOF10: 4

## 2025-04-21 ASSESSMENT — PAIN DESCRIPTION - DESCRIPTORS
DESCRIPTORS: ACHING
DESCRIPTORS: ACHING

## 2025-04-21 ASSESSMENT — PAIN SCALES - WONG BAKER
WONGBAKER_NUMERICALRESPONSE: NO HURT
WONGBAKER_NUMERICALRESPONSE: NO HURT

## 2025-04-21 ASSESSMENT — PAIN DESCRIPTION - LOCATION: LOCATION: BACK

## 2025-04-22 ENCOUNTER — APPOINTMENT (OUTPATIENT)
Age: 60
DRG: 422 | End: 2025-04-22
Payer: MEDICAID

## 2025-04-22 LAB
ALBUMIN PERCENT: 49 % (ref 56–66)
ALBUMIN SERPL-MCNC: 3 G/DL (ref 3.2–5.2)
ALPHA 2 PERCENT: 13 % (ref 7–12)
ALPHA1 GLOB SERPL ELPH-MCNC: 0.4 G/DL (ref 0.1–0.4)
ALPHA1 GLOB SERPL ELPH-MCNC: 7 % (ref 3–5)
ALPHA2 GLOB SERPL ELPH-MCNC: 0.8 G/DL (ref 0.5–0.9)
ANION GAP SERPL CALCULATED.3IONS-SCNC: 8 MMOL/L (ref 9–16)
B-GLOBULIN SERPL ELPH-MCNC: 0.8 G/DL (ref 0.7–1.4)
B-GLOBULIN SERPL ELPH-MCNC: 13 % (ref 8–13)
BASOPHILS # BLD: <0.03 K/UL (ref 0–0.2)
BASOPHILS NFR BLD: 0 % (ref 0–2)
BUN SERPL-MCNC: 13 MG/DL (ref 6–20)
CALCIUM SERPL-MCNC: 8.4 MG/DL (ref 8.6–10.4)
CHLORIDE SERPL-SCNC: 102 MMOL/L (ref 98–107)
CO2 SERPL-SCNC: 27 MMOL/L (ref 20–31)
CREAT SERPL-MCNC: 0.9 MG/DL (ref 0.7–1.2)
ECHO AO ROOT DIAM: 3.5 CM
ECHO AO ROOT INDEX: 1.49 CM/M2
ECHO AV AREA PEAK VELOCITY: 2.1 CM2
ECHO AV AREA VTI: 2.1 CM2
ECHO AV AREA/BSA PEAK VELOCITY: 0.9 CM2/M2
ECHO AV AREA/BSA VTI: 0.9 CM2/M2
ECHO AV MEAN GRADIENT: 2 MMHG
ECHO AV MEAN VELOCITY: 0.7 M/S
ECHO AV PEAK GRADIENT: 4 MMHG
ECHO AV PEAK VELOCITY: 1.1 M/S
ECHO AV VELOCITY RATIO: 0.55
ECHO AV VTI: 15.4 CM
ECHO BSA: 2.41 M2
ECHO EST RA PRESSURE: 3 MMHG
ECHO LA AREA 4C: 26.6 CM2
ECHO LA DIAMETER INDEX: 1.74 CM/M2
ECHO LA DIAMETER: 4.1 CM
ECHO LA MAJOR AXIS: 6.8 CM
ECHO LA TO AORTIC ROOT RATIO: 1.17
ECHO LA VOL MOD A4C: 72 ML (ref 18–58)
ECHO LA VOLUME INDEX MOD A4C: 31 ML/M2 (ref 16–34)
ECHO LV E' LATERAL VELOCITY: 12.9 CM/S
ECHO LV E' SEPTAL VELOCITY: 12.2 CM/S
ECHO LV EF PHYSICIAN: 55 %
ECHO LV FRACTIONAL SHORTENING: 21 % (ref 28–44)
ECHO LV INTERNAL DIMENSION DIASTOLE INDEX: 2.43 CM/M2
ECHO LV INTERNAL DIMENSION DIASTOLIC: 5.7 CM (ref 4.2–5.9)
ECHO LV INTERNAL DIMENSION SYSTOLIC INDEX: 1.91 CM/M2
ECHO LV INTERNAL DIMENSION SYSTOLIC: 4.5 CM
ECHO LV IVSD: 0.7 CM (ref 0.6–1)
ECHO LV MASS 2D: 170.2 G (ref 88–224)
ECHO LV MASS INDEX 2D: 72.4 G/M2 (ref 49–115)
ECHO LV POSTERIOR WALL DIASTOLIC: 0.9 CM (ref 0.6–1)
ECHO LV RELATIVE WALL THICKNESS RATIO: 0.32
ECHO LVOT AREA: 3.5 CM2
ECHO LVOT AV VTI INDEX: 0.61
ECHO LVOT DIAM: 2.1 CM
ECHO LVOT MEAN GRADIENT: 1 MMHG
ECHO LVOT PEAK GRADIENT: 2 MMHG
ECHO LVOT PEAK VELOCITY: 0.6 M/S
ECHO LVOT STROKE VOLUME INDEX: 13.8 ML/M2
ECHO LVOT SV: 32.5 ML
ECHO LVOT VTI: 9.4 CM
ECHO MV A VELOCITY: 0.57 M/S
ECHO MV AREA VTI: 1.5 CM2
ECHO MV E DECELERATION TIME (DT): 115 MS
ECHO MV E VELOCITY: 0.8 M/S
ECHO MV E/A RATIO: 1.4
ECHO MV E/E' LATERAL: 6.2
ECHO MV E/E' RATIO (AVERAGED): 6.38
ECHO MV E/E' SEPTAL: 6.56
ECHO MV LVOT VTI INDEX: 2.35
ECHO MV MAX VELOCITY: 1.5 M/S
ECHO MV MEAN GRADIENT: 3 MMHG
ECHO MV MEAN VELOCITY: 0.8 M/S
ECHO MV PEAK GRADIENT: 9 MMHG
ECHO MV VTI: 22.1 CM
ECHO PV MAX VELOCITY: 1 M/S
ECHO PV PEAK GRADIENT: 4 MMHG
ECHO RIGHT VENTRICULAR SYSTOLIC PRESSURE (RVSP): 40 MMHG
ECHO RV BASAL DIMENSION: 4.8 CM
ECHO RV FREE WALL PEAK S': 12.1 CM/S
ECHO TV REGURGITANT MAX VELOCITY: 3.03 M/S
ECHO TV REGURGITANT PEAK GRADIENT: 37 MMHG
EOSINOPHIL # BLD: 0.08 K/UL (ref 0–0.44)
EOSINOPHILS RELATIVE PERCENT: 1 % (ref 1–4)
ERYTHROCYTE [DISTWIDTH] IN BLOOD BY AUTOMATED COUNT: 12.9 % (ref 11.8–14.4)
GAMMA GLOB SERPL ELPH-MCNC: 1.1 G/DL (ref 0.5–1.5)
GAMMA GLOBULIN %: 18 % (ref 11–19)
GFR, ESTIMATED: >90 ML/MIN/1.73M2
GLUCOSE SERPL-MCNC: 81 MG/DL (ref 74–99)
HCT VFR BLD AUTO: 34.9 % (ref 40.7–50.3)
HGB BLD-MCNC: 10.9 G/DL (ref 13–17)
IMM GRANULOCYTES # BLD AUTO: 0.05 K/UL (ref 0–0.3)
IMM GRANULOCYTES NFR BLD: 1 %
ITYP INTERPRETATION: NORMAL
LYMPHOCYTES NFR BLD: 1.53 K/UL (ref 1.1–3.7)
LYMPHOCYTES RELATIVE PERCENT: 18 % (ref 24–43)
MAGNESIUM SERPL-MCNC: 2 MG/DL (ref 1.6–2.6)
MCH RBC QN AUTO: 29.9 PG (ref 25.2–33.5)
MCHC RBC AUTO-ENTMCNC: 31.2 G/DL (ref 28.4–34.8)
MCV RBC AUTO: 95.6 FL (ref 82.6–102.9)
MICROORGANISM SPEC CULT: NORMAL
MICROORGANISM SPEC CULT: NORMAL
MONOCYTES NFR BLD: 0.6 K/UL (ref 0.1–1.2)
MONOCYTES NFR BLD: 7 % (ref 3–12)
NEUTROPHILS NFR BLD: 73 % (ref 36–65)
NEUTS SEG NFR BLD: 6.07 K/UL (ref 1.5–8.1)
NRBC BLD-RTO: 0.2 PER 100 WBC
PATH REV: NORMAL
PATHOLOGIST: ABNORMAL
PLATELET # BLD AUTO: 186 K/UL (ref 138–453)
PMV BLD AUTO: 9.7 FL (ref 8.1–13.5)
POTASSIUM SERPL-SCNC: 3.4 MMOL/L (ref 3.7–5.3)
PROT PATTERN SERPL ELPH-IMP: ABNORMAL
PROT SERPL-MCNC: 6 G/DL (ref 6.6–8.7)
RBC # BLD AUTO: 3.65 M/UL (ref 4.21–5.77)
SERVICE CMNT-IMP: NORMAL
SERVICE CMNT-IMP: NORMAL
SODIUM SERPL-SCNC: 137 MMOL/L (ref 136–145)
SPECIMEN DESCRIPTION: NORMAL
SPECIMEN DESCRIPTION: NORMAL
TOTAL PROT. SUM,%: 100 % (ref 98–102)
TOTAL PROT. SUM: 6.1 G/DL (ref 6.3–8.2)
WBC OTHER # BLD: 8.3 K/UL (ref 3.5–11.3)

## 2025-04-22 PROCEDURE — 6370000000 HC RX 637 (ALT 250 FOR IP)

## 2025-04-22 PROCEDURE — 6360000004 HC RX CONTRAST MEDICATION

## 2025-04-22 PROCEDURE — 97166 OT EVAL MOD COMPLEX 45 MIN: CPT

## 2025-04-22 PROCEDURE — 97535 SELF CARE MNGMENT TRAINING: CPT

## 2025-04-22 PROCEDURE — 80048 BASIC METABOLIC PNL TOTAL CA: CPT

## 2025-04-22 PROCEDURE — 1200000000 HC SEMI PRIVATE

## 2025-04-22 PROCEDURE — 83735 ASSAY OF MAGNESIUM: CPT

## 2025-04-22 PROCEDURE — 99232 SBSQ HOSP IP/OBS MODERATE 35: CPT | Performed by: STUDENT IN AN ORGANIZED HEALTH CARE EDUCATION/TRAINING PROGRAM

## 2025-04-22 PROCEDURE — 97530 THERAPEUTIC ACTIVITIES: CPT

## 2025-04-22 PROCEDURE — 2500000003 HC RX 250 WO HCPCS

## 2025-04-22 PROCEDURE — 93306 TTE W/DOPPLER COMPLETE: CPT | Performed by: INTERNAL MEDICINE

## 2025-04-22 PROCEDURE — C8929 TTE W OR WO FOL WCON,DOPPLER: HCPCS

## 2025-04-22 PROCEDURE — 36415 COLL VENOUS BLD VENIPUNCTURE: CPT

## 2025-04-22 PROCEDURE — 85025 COMPLETE CBC W/AUTO DIFF WBC: CPT

## 2025-04-22 PROCEDURE — 99233 SBSQ HOSP IP/OBS HIGH 50: CPT | Performed by: NURSE PRACTITIONER

## 2025-04-22 PROCEDURE — 97162 PT EVAL MOD COMPLEX 30 MIN: CPT

## 2025-04-22 RX ORDER — QUETIAPINE FUMARATE 25 MG/1
25 TABLET, FILM COATED ORAL NIGHTLY
Qty: 30 TABLET | Refills: 1 | Status: SHIPPED | OUTPATIENT
Start: 2025-04-22

## 2025-04-22 RX ORDER — POTASSIUM CHLORIDE 1500 MG/1
40 TABLET, EXTENDED RELEASE ORAL PRN
Status: DISCONTINUED | OUTPATIENT
Start: 2025-04-22 | End: 2025-04-23 | Stop reason: HOSPADM

## 2025-04-22 RX ORDER — METOPROLOL TARTRATE 25 MG/1
25 TABLET, FILM COATED ORAL ONCE
Status: COMPLETED | OUTPATIENT
Start: 2025-04-22 | End: 2025-04-22

## 2025-04-22 RX ORDER — PANTOPRAZOLE SODIUM 40 MG/1
40 TABLET, DELAYED RELEASE ORAL
Qty: 30 TABLET | Refills: 3 | Status: SHIPPED | OUTPATIENT
Start: 2025-04-23

## 2025-04-22 RX ORDER — MULTIVITAMIN WITH IRON
1 TABLET ORAL DAILY
Qty: 30 TABLET | Refills: 1 | Status: SHIPPED | OUTPATIENT
Start: 2025-04-23

## 2025-04-22 RX ORDER — POTASSIUM CHLORIDE 7.45 MG/ML
10 INJECTION INTRAVENOUS PRN
Status: DISCONTINUED | OUTPATIENT
Start: 2025-04-22 | End: 2025-04-23 | Stop reason: HOSPADM

## 2025-04-22 RX ORDER — LANOLIN ALCOHOL/MO/W.PET/CERES
100 CREAM (GRAM) TOPICAL DAILY
Qty: 30 TABLET | Refills: 3 | Status: SHIPPED | OUTPATIENT
Start: 2025-04-23

## 2025-04-22 RX ORDER — PREDNISONE 20 MG/1
20 TABLET ORAL DAILY
Qty: 3 TABLET | Refills: 0 | Status: SHIPPED | OUTPATIENT
Start: 2025-04-23 | End: 2025-04-26

## 2025-04-22 RX ORDER — POTASSIUM CHLORIDE 1500 MG/1
40 TABLET, EXTENDED RELEASE ORAL ONCE
Status: COMPLETED | OUTPATIENT
Start: 2025-04-22 | End: 2025-04-22

## 2025-04-22 RX ORDER — FOLIC ACID 1 MG/1
1 TABLET ORAL DAILY
Qty: 30 TABLET | Refills: 3 | Status: SHIPPED | OUTPATIENT
Start: 2025-04-23

## 2025-04-22 RX ORDER — METOPROLOL TARTRATE 50 MG
50 TABLET ORAL 2 TIMES DAILY
Status: DISCONTINUED | OUTPATIENT
Start: 2025-04-22 | End: 2025-04-23 | Stop reason: HOSPADM

## 2025-04-22 RX ADMIN — Medication 100 MG: at 08:12

## 2025-04-22 RX ADMIN — FOLIC ACID 1 MG: 1 TABLET ORAL at 08:13

## 2025-04-22 RX ADMIN — SULFUR HEXAFLUORIDE 2 ML: KIT at 09:13

## 2025-04-22 RX ADMIN — APIXABAN 5 MG: 5 TABLET, FILM COATED ORAL at 08:12

## 2025-04-22 RX ADMIN — PREDNISONE 20 MG: 20 TABLET ORAL at 08:13

## 2025-04-22 RX ADMIN — APIXABAN 5 MG: 5 TABLET, FILM COATED ORAL at 21:31

## 2025-04-22 RX ADMIN — METOPROLOL TARTRATE 25 MG: 25 TABLET, FILM COATED ORAL at 08:13

## 2025-04-22 RX ADMIN — THERA TABS 1 TABLET: TAB at 08:13

## 2025-04-22 RX ADMIN — SODIUM CHLORIDE, PRESERVATIVE FREE 10 ML: 5 INJECTION INTRAVENOUS at 08:13

## 2025-04-22 RX ADMIN — ACETAMINOPHEN 650 MG: 325 TABLET ORAL at 08:12

## 2025-04-22 RX ADMIN — METOPROLOL TARTRATE 50 MG: 50 TABLET, FILM COATED ORAL at 21:31

## 2025-04-22 RX ADMIN — POTASSIUM CHLORIDE 40 MEQ: 1500 TABLET, EXTENDED RELEASE ORAL at 09:14

## 2025-04-22 RX ADMIN — PANTOPRAZOLE SODIUM 40 MG: 40 TABLET, DELAYED RELEASE ORAL at 06:00

## 2025-04-22 RX ADMIN — QUETIAPINE FUMARATE 25 MG: 25 TABLET ORAL at 21:31

## 2025-04-22 RX ADMIN — METOPROLOL TARTRATE 5 MG: 5 INJECTION INTRAVENOUS at 15:15

## 2025-04-22 RX ADMIN — Medication 5 MG: at 21:31

## 2025-04-22 RX ADMIN — METOPROLOL TARTRATE 25 MG: 25 TABLET, FILM COATED ORAL at 09:14

## 2025-04-22 RX ADMIN — QUETIAPINE FUMARATE 25 MG: 25 TABLET ORAL at 08:13

## 2025-04-22 ASSESSMENT — PAIN SCALES - WONG BAKER: WONGBAKER_NUMERICALRESPONSE: NO HURT

## 2025-04-22 ASSESSMENT — PAIN SCALES - GENERAL
PAINLEVEL_OUTOF10: 3
PAINLEVEL_OUTOF10: 1
PAINLEVEL_OUTOF10: 0

## 2025-04-22 ASSESSMENT — PAIN DESCRIPTION - DESCRIPTORS: DESCRIPTORS: ACHING

## 2025-04-22 ASSESSMENT — PAIN DESCRIPTION - LOCATION: LOCATION: ABDOMEN

## 2025-04-22 NOTE — PROGRESS NOTES
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 Loneliness/Social Isolation,  ,  ,      Name: Rasheed Bryant MRN: [id number]    Age: 59 y.o.     Sex: male   Language: English   Judaism: Denominational   Hypokalemia     Date: 4/19/2025            Total Time Calculated: (P) 40 min              Spiritual Assessment began in [facility]        Referral/Consult From: (P) Nurse (Intake)   Encounter Overview/Reason: (P) Loneliness/Social Isolation  Service Provided For: (P) Patient    Pt awake upright in bed alert. Pt spoke about Catholic belief and his struggle with keeping it. PT spoke about family and their struggles. Pt asked for prayer.     Miryam, Belief, Meaning:   Patient has beliefs or practices that help with coping during difficult times  Family/Friends have beliefs or practices that help with coping during difficult times      Importance and Influence:  Patient has spiritual/personal beliefs that influence decisions regarding their health  Family/Friends have spiritual/personal beliefs that influence decisions regarding the patient's health    Community:  Patient feels well-supported. Support system includes: Parent/s and Extended family  Family/Friends No family/friends present    Assessment and Plan of Care:     Patient Interventions include: Facilitated expression of thoughts and feelings, Explored spiritual coping/struggle/distress, Engaged in theological reflection, and Affirmed coping skills/support systems  Family/Friends Interventions include: No family/friends present    Patient Plan of Care: Spiritual Care available upon further referral  Family/Friends Plan of Care: No family/friends present     04/19/25 1547   Encounter Summary   Encounter Overview/Reason Loneliness/Social Isolation   Service Provided For Patient   Referral/Consult From Nurse  (Intake)   Support System Parent;Family members   Last Encounter  04/19/25   Complexity of Encounter Moderate 
Mitral Valve: Mild to moderate regurgitation with multiple jets.    Tricuspid Valve: Mild regurgitation.    Left Atrium: No left atrial appendage thrombus noted.    Image quality is adequate.    We tried 200 J followed by 360 J, patient converted to sinus rhythm for 5 seconds  and converted back to atrial fibrillation, recommned rate controle and discharge on anticoagulation.    Stress 4/21/25    Stress Combined Conclusion: The study is negative for myocardial ischemia. Findings suggest a moderate risk of cardiac events.    Stress Function: Left ventricular function post-stress is normal. Post-stress ejection fraction is 66%. The stress end diastolic cavity size is normal. The stress end systolic cavity size is normal.    Perfusion Comments: Prone images were not obtained. LV perfusion is normal. There is no evidence of inducible ischemia.    Perfusion Conclusion: There is no evidence of transient ischemic dilation (TID). TID ratio is 1.17.    ECG: The stress ECG was negative for ischemia.    ECG: Resting ECG demonstrates atrial fibrillation.    Stress Test: A pharmacological stress test was performed using regadenoson (Lexiscan).    Stress ECG: Arrhythmias during stress: atrial fibrillation. Non-specific ST abnormality noted.    Resting ECG: The ECG shows atrial fibrillation. Resting ECG shows non-specific ST-segment abnormalities.  Objective:   Vitals: BP (!) 147/119   Pulse (!) 101   Temp 97.9 °F (36.6 °C)   Resp 18   Ht 1.829 m (6' 0.01\")   Wt 114.8 kg (253 lb)   SpO2 97%   BMI 34.31 kg/m²   General appearance: alert and cooperative with exam  HEENT: Head: Normocephalic, no lesions, without obvious abnormality.  Neck: no JVD, trachea midline, no adenopathy  Lungs: Clear to auscultation  Heart: irregularly irregular, s1/s2 auscultated, no murmurs  Abdomen: soft, non-tender, bowel sounds active  Extremities: no edema  Neurologic: not done  Assessment / Acute Cardiac Problems:   Patient Active Problem 
insomnia  Seroquel 50 mg twice daily  Melatonin 5 mg nightly  Delirium precautions  Defer home trazodone due to hyponatremia    LBBB, previous  Chronic A-fib s/p RENETTA and cardioversion  Mild to moderate MR  Elevated troponin  Cardiology on board:   Stress test negative, low risk  Echocardiogram pending  Continue to monitor  Continue Eliquis 5 mg twice daily  Lopressor 25 mg twice daily increased to 50 mg if BP stable     Hypovolemic shock-resolved  Continue to monitor  Prednisone taper :  20 mg for 3 days, 15 mg for 3 days then 10 mg for 3 days and 5 mg for 3 days  Cortisol mildly elevated  TSH within range     BAR-resolved  [place] kidney  Follow up BMP  Strict I's and O's  Monitor urine output  Nephrology signed off    Hyponatremia, hypokalemia and hypochloremia - resolved  Hypovolemic hyponatremia  Serum osmolality 274  Urine osmolality 137, urine creatinine 48.3, U Na < 20  TSH normal  Potassium replacement per labs     Lactic acidosis: Resolved  Continue IV fluids  Follow-up in the a.m.     Essential hypertension  Lopressor 50 mg twice daily     Hyperlipidemia  Resume Crestor 20 mg daily     Ventral abdominal hernia -Stable  Continue bowel regimen  Serial abdominal exams  Continue to monitor    DVT prophylaxis: Eliquis 5 mg twice daily  Diet: Regular, low fat, high fiber, Fl restriction of 2000 cc  PT OT: Consulted  CM: Consulted      Sean Ramsey MD  PGY-1, Internal Medicine Resident  [facility]         4/22/2025, 8:22 AM

## 2025-04-22 NOTE — DISCHARGE INSTR - COC
Continuity of Care Form    Patient Name: Rasheed Bryant   :  1965  MRN:  [id number]    Admit date:  2025  Discharge date:  25    Code Status Order: Full Code   Advance Directives:     Admitting Physician:  Talat Hills MD  PCP: Paul Mujica, APRN - CNP    Discharging Nurse: Leanna Alejandra  Discharging Hospital Unit/Room#: 0324/0324-01  Discharging Unit Phone Number: [phone]    Emergency Contact:   Extended Emergency Contact Information  Primary Emergency Contact: Crispin Bryant  Mobile Phone: [phone]  Relation: Brother/Sister  Secondary Emergency Contact: louis weinberg  [place] Phone: [phone]  Mobile Phone: [phone]  Relation: Parent    Past Surgical History:  History reviewed. No pertinent surgical history.    Immunization History:   Immunization History   Administered Date(s) Administered    COVID-19, MODERNA BLUE border, Primary or Immunocompromised, (age 12y+), IM, 100 mcg/0.5mL 10/18/2021, 2021    COVID-19, MODERNA Booster BLUE border, (age 18y+), IM, 50mcg/0.25mL 2022       Active Problems:  Patient Active Problem List   Diagnosis Code    Oth fracture of shaft of radius, left arm, init for clos fx S52.392A    Head trauma, initial encounter S09.90XA    Chronic kidney disease N18.9    DVT (deep venous thrombosis) ([facility]) I82.409    H/O fasciotomy Z98.890    Hypertension I10    PAD (peripheral artery disease) I73.9    Arthritis M19.90    Anticoagulated Z79.01    Chronic atrial fibrillation ([facility]) I48.20    Nasal fracture S02.2XXA    Syncope and collapse R55    Dizziness R42    Atrial fibrillation with rapid ventricular response (HCC) I48.91    Syncope R55    Atrial fibrillation with RVR ([facility]) I48.91    Paroxysmal atrial fibrillation ([facility]) I48.0    BAR (acute kidney injury) N17.9    Septic shock ([facility]) A41.9, R65.21    Hypovolemic shock ([facility]) R57.1    Hypokalemia E87.6    Hyponatremia E87.1    Lactic acidosis E87.20    Chest pain R07.9       Isolation/Infection:

## 2025-04-22 NOTE — PLAN OF CARE
Problem: Safety - Adult  Goal: Free from fall injury  4/18/2025 1838 by Crissy Jauregui RN  Outcome: Progressing  Flowsheets (Taken 4/18/2025 0805)  Free From Fall Injury: Based on caregiver fall risk screen, instruct family/caregiver to ask for assistance with transferring infant if caregiver noted to have fall risk factors     Problem: Chronic Conditions and Co-morbidities  Goal: Patient's chronic conditions and co-morbidity symptoms are monitored and maintained or improved  4/18/2025 1838 by Crissy Jauregui RN  Outcome: Progressing  Flowsheets (Taken 4/18/2025 0800)  Care Plan - Patient's Chronic Conditions and Co-Morbidity Symptoms are Monitored and Maintained or Improved: Monitor and assess patient's chronic conditions and comorbid symptoms for stability, deterioration, or improvement     Problem: Discharge Planning  Goal: Discharge to home or other facility with appropriate resources  4/18/2025 1838 by Crissy Jauregui RN  Outcome: Progressing  Flowsheets (Taken 4/18/2025 0800)  Discharge to home or other facility with appropriate resources: Refer to discharge planning if patient needs post-hospital services based on physician order or complex needs related to functional status, cognitive ability or social support system     Problem: Pain  Goal: Verbalizes/displays adequate comfort level or baseline comfort level  4/18/2025 1838 by Crissy Jauregui RN  Outcome: Progressing  Flowsheets  Taken 4/18/2025 1200  Verbalizes/displays adequate comfort level or baseline comfort level: Assess pain using appropriate pain scale  Taken 4/18/2025 0800  Verbalizes/displays adequate comfort level or baseline comfort level: Assess pain using appropriate pain scale     Problem: Skin/Tissue Integrity  Goal: Skin integrity remains intact  Description: 1.  Monitor for areas of redness and/or skin breakdown2.  Assess vascular access sites hourly3.  Every 4-6 hours minimum:  Change oxygen saturation probe site4.  Every 4-6 
  Problem: Safety - Adult  Goal: Free from fall injury  4/19/2025 0812 by Steve Zamora RN  Outcome: Progressing  4/19/2025 0356 by Mayur White RN  Outcome: Progressing  4/18/2025 1838 by Crissy Jauregui RN  Outcome: Progressing  Flowsheets (Taken 4/18/2025 0805)  Free From Fall Injury: Based on caregiver fall risk screen, instruct family/caregiver to ask for assistance with transferring infant if caregiver noted to have fall risk factors     Problem: Chronic Conditions and Co-morbidities  Goal: Patient's chronic conditions and co-morbidity symptoms are monitored and maintained or improved  4/19/2025 0812 by Steve Zamora RN  Outcome: Progressing  4/19/2025 0356 by Mayur White RN  Outcome: Progressing  4/18/2025 1838 by Crissy Jauregui RN  Outcome: Progressing  Flowsheets (Taken 4/18/2025 0800)  Care Plan - Patient's Chronic Conditions and Co-Morbidity Symptoms are Monitored and Maintained or Improved: Monitor and assess patient's chronic conditions and comorbid symptoms for stability, deterioration, or improvement     Problem: Discharge Planning  Goal: Discharge to home or other facility with appropriate resources  4/19/2025 0812 by Steve Zamora RN  Outcome: Progressing  4/19/2025 0356 by Mayur White RN  Outcome: Progressing  4/18/2025 1838 by Crissy Jauregui RN  Outcome: Progressing  Flowsheets (Taken 4/18/2025 0800)  Discharge to home or other facility with appropriate resources: Refer to discharge planning if patient needs post-hospital services based on physician order or complex needs related to functional status, cognitive ability or social support system     
  Problem: Safety - Adult  Goal: Free from fall injury  4/21/2025 0105 by Pop Owens RN  Outcome: Progressing  4/20/2025 1707 by Leanna Saleh RN  Outcome: Progressing     Problem: Chronic Conditions and Co-morbidities  Goal: Patient's chronic conditions and co-morbidity symptoms are monitored and maintained or improved  4/21/2025 0105 by Pop Owens RN  Outcome: Progressing  4/20/2025 1707 by Leanna Saleh RN  Outcome: Progressing     Problem: Discharge Planning  Goal: Discharge to home or other facility with appropriate resources  4/21/2025 0105 by Pop Owens RN  Outcome: Progressing  4/20/2025 1707 by Leanna Saleh RN  Outcome: Progressing     Problem: Pain  Goal: Verbalizes/displays adequate comfort level or baseline comfort level  4/21/2025 0105 by Pop Owens RN  Outcome: Progressing  4/20/2025 1707 by Leanna Saleh RN  Outcome: Progressing     Problem: Skin/Tissue Integrity  Goal: Skin integrity remains intact  Description: 1.  Monitor for areas of redness and/or skin breakdown2.  Assess vascular access sites hourly3.  Every 4-6 hours minimum:  Change oxygen saturation probe site4.  Every 4-6 hours:  If on nasal continuous positive airway pressure, respiratory therapy assess nares and determine need for appliance change or resting period  4/21/2025 0105 by Pop Owens RN  Outcome: Progressing  4/20/2025 1707 by Leanna Saleh RN  Outcome: Progressing     Problem: ABCDS Injury Assessment  Goal: Absence of physical injury  4/21/2025 0105 by Pop Owens RN  Outcome: Progressing  4/20/2025 1707 by Leanna Saleh RN  Outcome: Progressing     Problem: Neurosensory - Adult  Goal: Achieves maximal functionality and self care  4/21/2025 0105 by Pop Owens RN  Outcome: Progressing  4/20/2025 1707 by Leanna Saleh RN  Outcome: Progressing     Problem: Respiratory - Adult  Goal: Achieves optimal ventilation and oxygenation  4/21/2025 0105 by Roman 
  Problem: Safety - Adult  Goal: Free from fall injury  4/22/2025 0446 by Michelle Oreilly RN  Outcome: Progressing  4/21/2025 1711 by Leanna Saleh RN  Outcome: Progressing     Problem: Chronic Conditions and Co-morbidities  Goal: Patient's chronic conditions and co-morbidity symptoms are monitored and maintained or improved  4/22/2025 0446 by Michelle Oreilly RN  Outcome: Progressing  4/21/2025 1711 by Leanna Saleh RN  Outcome: Progressing     Problem: Discharge Planning  Goal: Discharge to home or other facility with appropriate resources  4/22/2025 0446 by Michelle Oreilly RN  Outcome: Progressing  4/21/2025 1711 by Leanna Saleh RN  Outcome: Progressing     Problem: Pain  Goal: Verbalizes/displays adequate comfort level or baseline comfort level  4/22/2025 0446 by Michelle Oreilly RN  Outcome: Progressing  4/21/2025 1711 by Leanna Saleh RN  Outcome: Progressing     Problem: Skin/Tissue Integrity  Goal: Skin integrity remains intact  Description: 1.  Monitor for areas of redness and/or skin breakdown2.  Assess vascular access sites hourly3.  Every 4-6 hours minimum:  Change oxygen saturation probe site4.  Every 4-6 hours:  If on nasal continuous positive airway pressure, respiratory therapy assess nares and determine need for appliance change or resting period  4/22/2025 0446 by Michelle Oreilly RN  Outcome: Progressing  4/21/2025 1711 by Leanna Saleh RN  Outcome: Progressing     Problem: ABCDS Injury Assessment  Goal: Absence of physical injury  4/22/2025 0446 by Michelle Oreilly RN  Outcome: Progressing  4/21/2025 1711 by Leanna Saleh RN  Outcome: Progressing     Problem: Neurosensory - Adult  Goal: Achieves maximal functionality and self care  4/22/2025 0446 by Michelle Oreilly RN  Outcome: Progressing  4/21/2025 1711 by Leanna Saleh RN  Outcome: Progressing     Problem: Respiratory - Adult  Goal: Achieves optimal ventilation and 
  Problem: Safety - Adult  Goal: Free from fall injury  4/22/2025 1222 by Leanna Saleh RN  Outcome: Completed  4/22/2025 0446 by Michelle Oreilly RN  Outcome: Progressing     Problem: Chronic Conditions and Co-morbidities  Goal: Patient's chronic conditions and co-morbidity symptoms are monitored and maintained or improved  4/22/2025 1222 by Leanna Saleh RN  Outcome: Completed  4/22/2025 0446 by Michelle Oreilly RN  Outcome: Progressing     Problem: Discharge Planning  Goal: Discharge to home or other facility with appropriate resources  4/22/2025 1222 by Leanna Saleh RN  Outcome: Completed  4/22/2025 0446 by Michelle Oreilly RN  Outcome: Progressing     Problem: Pain  Goal: Verbalizes/displays adequate comfort level or baseline comfort level  4/22/2025 1222 by Leanna Saleh RN  Outcome: Completed  4/22/2025 0446 by Michelle Oreilly RN  Outcome: Progressing     Problem: Skin/Tissue Integrity  Goal: Skin integrity remains intact  Description: 1.  Monitor for areas of redness and/or skin breakdown2.  Assess vascular access sites hourly3.  Every 4-6 hours minimum:  Change oxygen saturation probe site4.  Every 4-6 hours:  If on nasal continuous positive airway pressure, respiratory therapy assess nares and determine need for appliance change or resting period  4/22/2025 1222 by Leanna Saleh RN  Outcome: Completed  4/22/2025 0446 by Michelle Oreilly RN  Outcome: Progressing     Problem: ABCDS Injury Assessment  Goal: Absence of physical injury  4/22/2025 1222 by Leanna Saleh RN  Outcome: Completed  4/22/2025 0446 by Michelle Oreilly RN  Outcome: Progressing     Problem: Neurosensory - Adult  Goal: Achieves maximal functionality and self care  4/22/2025 1222 by Leanna Saleh RN  Outcome: Completed  4/22/2025 0446 by Michelle Oreilly RN  Outcome: Progressing     Problem: Respiratory - Adult  Goal: Achieves optimal ventilation and oxygenation  4/22/2025 1222 by 
  Problem: Safety - Adult  Goal: Free from fall injury  Outcome: Progressing     Problem: Chronic Conditions and Co-morbidities  Goal: Patient's chronic conditions and co-morbidity symptoms are monitored and maintained or improved  Outcome: Progressing     Problem: Discharge Planning  Goal: Discharge to home or other facility with appropriate resources  Outcome: Progressing     Problem: Pain  Goal: Verbalizes/displays adequate comfort level or baseline comfort level  Outcome: Progressing     
  Problem: Safety - Adult  Goal: Free from fall injury  Outcome: Progressing     Problem: Chronic Conditions and Co-morbidities  Goal: Patient's chronic conditions and co-morbidity symptoms are monitored and maintained or improved  Outcome: Progressing     Problem: Discharge Planning  Goal: Discharge to home or other facility with appropriate resources  Outcome: Progressing     Problem: Pain  Goal: Verbalizes/displays adequate comfort level or baseline comfort level  Outcome: Progressing     Problem: Skin/Tissue Integrity  Goal: Skin integrity remains intact  Description: 1.  Monitor for areas of redness and/or skin breakdown2.  Assess vascular access sites hourly3.  Every 4-6 hours minimum:  Change oxygen saturation probe site4.  Every 4-6 hours:  If on nasal continuous positive airway pressure, respiratory therapy assess nares and determine need for appliance change or resting period  Outcome: Progressing     Problem: ABCDS Injury Assessment  Goal: Absence of physical injury  Outcome: Progressing     Problem: Neurosensory - Adult  Goal: Achieves maximal functionality and self care  Outcome: Progressing     Problem: Respiratory - Adult  Goal: Achieves optimal ventilation and oxygenation  Outcome: Progressing     Problem: Cardiovascular - Adult  Goal: Maintains optimal cardiac output and hemodynamic stability  Outcome: Progressing  Goal: Absence of cardiac dysrhythmias or at baseline  Outcome: Progressing     Problem: Skin/Tissue Integrity - Adult  Goal: Skin integrity remains intact  Description: 1.  Monitor for areas of redness and/or skin breakdown2.  Assess vascular access sites hourly3.  Every 4-6 hours minimum:  Change oxygen saturation probe site4.  Every 4-6 hours:  If on nasal continuous positive airway pressure, respiratory therapy assess nares and determine need for appliance change or resting period  Outcome: Progressing     Problem: Musculoskeletal - Adult  Goal: Return mobility to safest level of 
Progressing  4/18/2025 1838 by Crissy Jauregui, RN  Outcome: Progressing  Flowsheets (Taken 4/18/2025 0800)  Maintains hematologic stability: Assess for signs and symptoms of bleeding or hemorrhage

## 2025-04-22 NOTE — DISCHARGE INSTRUCTIONS
You are seen in the hospital due to electrolyte abnormalities and require cardiac evaluation due to new EKG findings.    Your electrolytes have normalized, cardiology has evaluated you you had a negative stress test and echocardiogram was also completed.    You will need to follow-up with your primary care physician in 1 week for continued monitoring and therapy.  You are also started on Seroquel due to difficulty sleeping as well as confusion.  Lisinopril has been stopped due to low blood pressure.  Please follow-up with your primary care physician as well as your nephrologist for resumption.    You will need to follow-up with Dr. Spencer in 3 weeks.  You will also need to get a repeat BMP in 1 week.    You are being prescribed folate thiamine and multivitamin as well as prednisone.  Please take medications as prescribed.  You are also being referred to physical therapy here at [facility].    Please return the emergency department with any new or worsening signs or symptoms.

## 2025-04-23 VITALS
OXYGEN SATURATION: 98 % | HEIGHT: 72 IN | BODY MASS INDEX: 34.27 KG/M2 | RESPIRATION RATE: 18 BRPM | SYSTOLIC BLOOD PRESSURE: 138 MMHG | TEMPERATURE: 97.9 F | WEIGHT: 253 LBS | DIASTOLIC BLOOD PRESSURE: 92 MMHG | HEART RATE: 81 BPM

## 2025-04-23 LAB
ANION GAP SERPL CALCULATED.3IONS-SCNC: 12 MMOL/L (ref 9–16)
BASOPHILS # BLD: <0.03 K/UL (ref 0–0.2)
BASOPHILS NFR BLD: 0 % (ref 0–2)
BUN SERPL-MCNC: 11 MG/DL (ref 6–20)
CALCIUM SERPL-MCNC: 8.4 MG/DL (ref 8.6–10.4)
CHLORIDE SERPL-SCNC: 102 MMOL/L (ref 98–107)
CO2 SERPL-SCNC: 23 MMOL/L (ref 20–31)
CREAT SERPL-MCNC: 0.9 MG/DL (ref 0.7–1.2)
EOSINOPHIL # BLD: 0.09 K/UL (ref 0–0.44)
EOSINOPHILS RELATIVE PERCENT: 1 % (ref 1–4)
ERYTHROCYTE [DISTWIDTH] IN BLOOD BY AUTOMATED COUNT: 13.3 % (ref 11.8–14.4)
GFR, ESTIMATED: >90 ML/MIN/1.73M2
GLUCOSE SERPL-MCNC: 93 MG/DL (ref 74–99)
HCT VFR BLD AUTO: 36.7 % (ref 40.7–50.3)
HGB BLD-MCNC: 11.4 G/DL (ref 13–17)
IMM GRANULOCYTES # BLD AUTO: 0.14 K/UL (ref 0–0.3)
IMM GRANULOCYTES NFR BLD: 1 %
LYMPHOCYTES NFR BLD: 1.65 K/UL (ref 1.1–3.7)
LYMPHOCYTES RELATIVE PERCENT: 10 % (ref 24–43)
MAGNESIUM SERPL-MCNC: 1.7 MG/DL (ref 1.6–2.6)
MCH RBC QN AUTO: 29.8 PG (ref 25.2–33.5)
MCHC RBC AUTO-ENTMCNC: 31.1 G/DL (ref 28.4–34.8)
MCV RBC AUTO: 96.1 FL (ref 82.6–102.9)
MONOCYTES NFR BLD: 0.96 K/UL (ref 0.1–1.2)
MONOCYTES NFR BLD: 6 % (ref 3–12)
NEUTROPHILS NFR BLD: 82 % (ref 36–65)
NEUTS SEG NFR BLD: 13.22 K/UL (ref 1.5–8.1)
NRBC BLD-RTO: 0.1 PER 100 WBC
PLATELET # BLD AUTO: 196 K/UL (ref 138–453)
PMV BLD AUTO: 10.2 FL (ref 8.1–13.5)
POTASSIUM SERPL-SCNC: 3.3 MMOL/L (ref 3.7–5.3)
RBC # BLD AUTO: 3.82 M/UL (ref 4.21–5.77)
SODIUM SERPL-SCNC: 137 MMOL/L (ref 136–145)
WBC OTHER # BLD: 16.1 K/UL (ref 3.5–11.3)

## 2025-04-23 PROCEDURE — 85025 COMPLETE CBC W/AUTO DIFF WBC: CPT

## 2025-04-23 PROCEDURE — 6370000000 HC RX 637 (ALT 250 FOR IP)

## 2025-04-23 PROCEDURE — 36415 COLL VENOUS BLD VENIPUNCTURE: CPT

## 2025-04-23 PROCEDURE — 83735 ASSAY OF MAGNESIUM: CPT

## 2025-04-23 PROCEDURE — 80048 BASIC METABOLIC PNL TOTAL CA: CPT

## 2025-04-23 RX ORDER — LOSARTAN POTASSIUM 50 MG/1
25 TABLET ORAL DAILY
Status: DISCONTINUED | OUTPATIENT
Start: 2025-04-23 | End: 2025-04-23 | Stop reason: HOSPADM

## 2025-04-23 RX ORDER — POTASSIUM CHLORIDE 7.45 MG/ML
10 INJECTION INTRAVENOUS ONCE
Status: DISCONTINUED | OUTPATIENT
Start: 2025-04-23 | End: 2025-04-23 | Stop reason: HOSPADM

## 2025-04-23 RX ADMIN — FOLIC ACID 1 MG: 1 TABLET ORAL at 08:30

## 2025-04-23 RX ADMIN — Medication 100 MG: at 08:29

## 2025-04-23 RX ADMIN — POTASSIUM BICARBONATE 40 MEQ: 782 TABLET, EFFERVESCENT ORAL at 08:29

## 2025-04-23 RX ADMIN — THERA TABS 1 TABLET: TAB at 08:30

## 2025-04-23 RX ADMIN — PANTOPRAZOLE SODIUM 40 MG: 40 TABLET, DELAYED RELEASE ORAL at 04:35

## 2025-04-23 RX ADMIN — QUETIAPINE FUMARATE 25 MG: 25 TABLET ORAL at 08:30

## 2025-04-23 RX ADMIN — APIXABAN 5 MG: 5 TABLET, FILM COATED ORAL at 08:29

## 2025-04-23 RX ADMIN — PREDNISONE 20 MG: 20 TABLET ORAL at 08:29

## 2025-04-23 RX ADMIN — METOPROLOL TARTRATE 50 MG: 50 TABLET, FILM COATED ORAL at 08:30

## 2025-04-23 RX ADMIN — LOSARTAN POTASSIUM 25 MG: 50 TABLET, FILM COATED ORAL at 08:29

## 2025-04-23 ASSESSMENT — PAIN SCALES - GENERAL: PAINLEVEL_OUTOF10: 7

## 2025-04-23 NOTE — CARE COORDINATION
Discharge Report    [facility]  Clinical Case Management Department  Written by: ERICK MEZA    Patient Name: Rasheed Bryant  Attending Provider: Talat Hills MD  Admit Date: 2025  4:45 AM  MRN: [id number]  Account: [id number]                     : 1965  Discharge Date: 25      Disposition: home    ERICK MEZA

## 2025-04-23 NOTE — FLOWSHEET NOTE
Discharge instructions reviewed with patient - all questions answered. Medications delivered via meds to beds. Belongings gathered and sent with patient. Patient transported to hospital main lobby via wheelchair with black and white cab voucher in hand. Patients mother notified of patients discharge.

## 2025-04-26 LAB
EKG ATRIAL RATE: 111 BPM
EKG Q-T INTERVAL: 368 MS
EKG QRS DURATION: 110 MS
EKG QTC CALCULATION (BAZETT): 479 MS
EKG R AXIS: 11 DEGREES
EKG T AXIS: 91 DEGREES
EKG VENTRICULAR RATE: 102 BPM